# Patient Record
Sex: FEMALE | Race: WHITE | Employment: UNEMPLOYED | URBAN - METROPOLITAN AREA
[De-identification: names, ages, dates, MRNs, and addresses within clinical notes are randomized per-mention and may not be internally consistent; named-entity substitution may affect disease eponyms.]

---

## 2017-03-01 PROBLEM — Z96.641 STATUS POST TOTAL REPLACEMENT OF RIGHT HIP: Status: ACTIVE | Noted: 2017-03-01

## 2018-06-22 ENCOUNTER — HOSPITAL ENCOUNTER (OUTPATIENT)
Dept: RADIATION ONCOLOGY | Age: 83
Discharge: HOME | End: 2018-06-22
Payer: COMMERCIAL

## 2018-06-26 ENCOUNTER — HOSPITAL ENCOUNTER (OUTPATIENT)
Dept: RADIATION ONCOLOGY | Age: 83
Discharge: HOME | End: 2018-06-26
Payer: COMMERCIAL

## 2018-06-26 VITALS
DIASTOLIC BLOOD PRESSURE: 72 MMHG | HEIGHT: 60 IN | HEART RATE: 94 BPM | WEIGHT: 150.2 LBS | TEMPERATURE: 98 F | BODY MASS INDEX: 29.49 KG/M2 | SYSTOLIC BLOOD PRESSURE: 118 MMHG

## 2018-06-26 DIAGNOSIS — C80.1 CANCER (HCC): Primary | ICD-10-CM

## 2018-06-26 PROCEDURE — 99205 OFFICE O/P NEW HI 60 MIN: CPT | Performed by: RADIOLOGY

## 2018-06-26 PROCEDURE — 99205 OFFICE O/P NEW HI 60 MIN: CPT

## 2018-06-26 RX ORDER — FLUOROURACIL 50 MG/G
CREAM TOPICAL 2 TIMES DAILY
COMMUNITY
End: 2019-06-27

## 2018-07-02 ENCOUNTER — HOSPITAL ENCOUNTER (OUTPATIENT)
Dept: RADIATION ONCOLOGY | Age: 83
Discharge: HOME | End: 2018-07-02
Payer: COMMERCIAL

## 2018-07-03 PROCEDURE — 77290 THER RAD SIMULAJ FIELD CPLX: CPT

## 2018-07-03 PROCEDURE — 77334 RADIATION TREATMENT AID(S): CPT

## 2018-07-17 ENCOUNTER — HOSPITAL ENCOUNTER (OUTPATIENT)
Dept: RADIATION ONCOLOGY | Age: 83
Discharge: HOME | End: 2018-07-17
Payer: COMMERCIAL

## 2018-07-17 DIAGNOSIS — C80.1 CANCER (HCC): Primary | ICD-10-CM

## 2018-07-17 PROCEDURE — 77334 RADIATION TREATMENT AID(S): CPT

## 2018-07-17 PROCEDURE — 77290 THER RAD SIMULAJ FIELD CPLX: CPT

## 2018-07-17 PROCEDURE — 77300 RADIATION THERAPY DOSE PLAN: CPT

## 2018-07-17 PROCEDURE — 99999 PR OFFICE/OUTPT VISIT,PROCEDURE ONLY: CPT | Performed by: RADIOLOGY

## 2018-07-17 NOTE — PROGRESS NOTES
(PRAVACHOL) 40 MG tablet Take 40 mg by mouth daily      amLODIPine (NORVASC) 10 MG tablet Take 10 mg by mouth daily       No current facility-administered medications for this encounter. OBJECTIVE:  Alert and fully ambulatory. Pleasant and conversant. Physical Examination: General appearance - alert, well appearing, and in no distress. Wt Readings from Last 3 Encounters:   06/26/18 150 lb 3.2 oz (68.1 kg)   03/01/17 179 lb (81.2 kg)   01/18/17 130 lb (59 kg)         ASSESSMENT/PLAN:     Patient is tolerating treatments well with expected toxicities. RBA were reviewed prior to first fraction and PRN. Current and planned dose reviewed. Goals of treatment and potential side effects were reviewed with the patient PRN. Treatment imaging has been personally reviewed for accuracy and precision. Questions answered to apparent satisfaction. Treatments will continue as planned. Carey Monroe.  Marie Anderson MD MS DABR  Radiation Oncologist        [facility]): [phone] /// FAX: [phone]  [facility]): [phone] /// FAX: [phone]  [facility]): [phone] /// FAX: [phone]

## 2018-07-17 NOTE — PATIENT INSTRUCTIONS
Start RT      Jed Sher. MD Kinjal Castro [facility]  Oncology  Cell: [phone]    [facility]:  [phone]   FAX: [phone] [address] [facility]:  [address] [place] [address] Avenue:    [phone]  [facility]:  [phone]   FAX:  [phone]  Email: [email or web address]

## 2018-07-19 PROCEDURE — 77412 RADIATION TX DELIVERY LVL 3: CPT

## 2018-07-20 PROCEDURE — 77412 RADIATION TX DELIVERY LVL 3: CPT

## 2018-07-23 PROCEDURE — 77412 RADIATION TX DELIVERY LVL 3: CPT

## 2018-07-24 ENCOUNTER — HOSPITAL ENCOUNTER (OUTPATIENT)
Dept: RADIATION ONCOLOGY | Age: 83
Discharge: HOME | End: 2018-07-24
Payer: COMMERCIAL

## 2018-07-24 DIAGNOSIS — C80.1 CANCER (HCC): Primary | ICD-10-CM

## 2018-07-24 PROCEDURE — 99999 PR OFFICE/OUTPT VISIT,PROCEDURE ONLY: CPT | Performed by: RADIOLOGY

## 2018-07-24 PROCEDURE — 77412 RADIATION TX DELIVERY LVL 3: CPT

## 2018-07-24 NOTE — PROGRESS NOTES
DEPARTMENT OF RADIATION ONCOLOGY ON TREATMENT VISIT         7/24/2018      NAME:  Nikki Cruz Chrissie OF BIRTH:  1935      Diagnosis:  Skin CA LLE      SUBJECTIVE:   Asad Brice has now received fractionated external beam radiation therapy - ongoing. Past medical, surgical, social and family histories reviewed and updated as indicated. Pain: controlled      ALLERGIES:  Patient has no known allergies. Current Outpatient Prescriptions   Medication Sig Dispense Refill    apixaban (ELIQUIS) 2.5 MG TABS tablet Take by mouth 2 times daily      insulin detemir (LEVEMIR) 100 UNIT/ML injection vial Inject into the skin nightly      fluorouracil (EFUDEX) 5 % cream Apply topically 2 times daily Apply topically 2 times daily.       rivaroxaban (XARELTO) 20 MG TABS tablet Take 20 mg by mouth nightly      glipiZIDE (GLUCOTROL) 5 MG tablet Take 1 tablet by mouth every morning (before breakfast) 60 tablet 3    pantoprazole (PROTONIX) 40 MG tablet Take 1 tablet by mouth every morning (before breakfast) 30 tablet 3    HYDROcodone-acetaminophen (NORCO) 5-325 MG per tablet Take 1 tablet by mouth every 4 hours as needed for Pain 60 tablet 0    hypromellose (ARTIFICIAL TEARS) 0.4 % SOLN ophthalmic solution Place 2 drops into both eyes every 4 hours as needed      Sodium Phosphates (FLEET) 7-19 GM/118ML Place 1 enema rectally See Admin Instructions Every fourth day if no bm      mirtazapine (REMERON) 15 MG tablet Take 15 mg by mouth nightly      traMADol (ULTRAM) 50 MG tablet Take 1 tablet by mouth 3 times daily 90 tablet 0    Bisacodyl (DULCOLAX RE) Place rectally      magnesium hydroxide (MILK OF MAGNESIA) 400 MG/5ML suspension Take by mouth daily as needed for Constipation      memantine (NAMENDA XR) 28 MG CP24 capsule Take 28 mg by mouth daily      acetaminophen (TYLENOL) 325 MG tablet Take 650 mg by mouth every 4 hours as needed for Pain      pravastatin (PRAVACHOL) 40 MG tablet Take 40 mg by mouth daily      amLODIPine (NORVASC) 10 MG tablet Take 10 mg by mouth daily       No current facility-administered medications for this encounter. OBJECTIVE:  Alert and fully ambulatory. Pleasant and conversant. Physical Examination: General appearance - alert, well appearing, and in no distress /// baseline. Wt Readings from Last 3 Encounters:   06/26/18 150 lb 3.2 oz (68.1 kg)   03/01/17 179 lb (81.2 kg)   01/18/17 130 lb (59 kg)           ASSESSMENT/PLAN:     Patient is tolerating treatments well with expected toxicities. RBA were reviewed prior to first fraction and PRN. Current and planned dose reviewed. Goals of treatment and potential side effects were reviewed with the patient PRN. Treatment imaging has been personally reviewed for accuracy and precision. Questions answered to apparent satisfaction. Treatments will continue as planned. Davey Rahman.  Harris Escalona MD MS DABR  Radiation Oncologist        [facility] ([address] [place] [address]): [phone] /// FAX: [phone]  [facility]): [phone] /// FAX: [phone]  [address] [place] [address]): [phone] /// FAX: [phone]

## 2018-07-25 ENCOUNTER — HOSPITAL ENCOUNTER (OUTPATIENT)
Dept: RADIATION ONCOLOGY | Age: 83
Discharge: HOME | End: 2018-07-25
Payer: COMMERCIAL

## 2018-07-25 PROCEDURE — 77336 RADIATION PHYSICS CONSULT: CPT

## 2018-07-25 PROCEDURE — 77412 RADIATION TX DELIVERY LVL 3: CPT

## 2018-07-26 PROCEDURE — 77412 RADIATION TX DELIVERY LVL 3: CPT

## 2018-07-27 PROCEDURE — 77412 RADIATION TX DELIVERY LVL 3: CPT

## 2018-07-30 PROCEDURE — 77412 RADIATION TX DELIVERY LVL 3: CPT

## 2018-07-31 ENCOUNTER — HOSPITAL ENCOUNTER (OUTPATIENT)
Dept: RADIATION ONCOLOGY | Age: 83
Discharge: HOME | End: 2018-07-31
Payer: COMMERCIAL

## 2018-07-31 DIAGNOSIS — C80.1 CANCER (HCC): Primary | ICD-10-CM

## 2018-07-31 PROCEDURE — 77412 RADIATION TX DELIVERY LVL 3: CPT

## 2018-07-31 PROCEDURE — 99999 PR OFFICE/OUTPT VISIT,PROCEDURE ONLY: CPT | Performed by: RADIOLOGY

## 2018-07-31 NOTE — PATIENT INSTRUCTIONS
Continue daily fractionated radiation therapy as scheduled. Please see weekly OTV note and intial consultation letter in [facility] for clinical details. Hugo Hopper.  Michelle Wright MD [address] [place] [address]  Radiation Oncology  [place]:  [phone]   FAX:    [phone] [facility]: [address] [place] [address] Road:  [phone]

## 2018-08-01 ENCOUNTER — HOSPITAL ENCOUNTER (OUTPATIENT)
Dept: RADIATION ONCOLOGY | Age: 83
Discharge: HOME | End: 2018-08-01
Payer: COMMERCIAL

## 2018-08-01 PROCEDURE — 77412 RADIATION TX DELIVERY LVL 3: CPT

## 2018-08-01 PROCEDURE — 77336 RADIATION PHYSICS CONSULT: CPT

## 2018-08-02 ENCOUNTER — HOSPITAL ENCOUNTER (OUTPATIENT)
Dept: RADIATION ONCOLOGY | Age: 83
Discharge: HOME | End: 2018-08-02
Payer: COMMERCIAL

## 2018-08-02 VITALS — SYSTOLIC BLOOD PRESSURE: 128 MMHG | HEART RATE: 101 BPM | DIASTOLIC BLOOD PRESSURE: 82 MMHG

## 2018-08-02 PROCEDURE — 77412 RADIATION TX DELIVERY LVL 3: CPT

## 2018-08-02 NOTE — PROGRESS NOTES
Rebekah Prado  8/2/2018  2:31 PM      Chief Complaint   Patient presents with    Cancer          Wt Readings from Last 3 Encounters:   06/26/18 150 lb 3.2 oz (68.1 kg)   03/01/17 179 lb (81.2 kg)   01/18/17 130 lb (59 kg)       Comments: OTV @ 3025 cGy using 6 MeV electrons (fx 11/20). She is ultimately planned for 5500 cGy in 275 cGy fx. Her lesion appears more confined trhan her initial description so it appears to be working.  No new lesions observed      Plan: Continue XRT to 5500 cGy (9 more fx)      Electronically signed by Gerardo Cano MD on 8/2/18 at 2:31 PM

## 2018-08-03 PROCEDURE — 77412 RADIATION TX DELIVERY LVL 3: CPT

## 2018-08-06 PROCEDURE — 77412 RADIATION TX DELIVERY LVL 3: CPT

## 2018-08-07 ENCOUNTER — HOSPITAL ENCOUNTER (OUTPATIENT)
Dept: RADIATION ONCOLOGY | Age: 83
Discharge: HOME | End: 2018-08-07
Payer: COMMERCIAL

## 2018-08-07 VITALS — DIASTOLIC BLOOD PRESSURE: 85 MMHG | SYSTOLIC BLOOD PRESSURE: 115 MMHG | TEMPERATURE: 98.3 F | HEART RATE: 108 BPM

## 2018-08-07 DIAGNOSIS — C80.1 CANCER (HCC): Primary | ICD-10-CM

## 2018-08-07 PROCEDURE — 99999 PR OFFICE/OUTPT VISIT,PROCEDURE ONLY: CPT | Performed by: RADIOLOGY

## 2018-08-07 PROCEDURE — 77412 RADIATION TX DELIVERY LVL 3: CPT

## 2018-08-07 NOTE — PATIENT INSTRUCTIONS
Continue daily fractionated radiation therapy as scheduled. Please see weekly OTV note and intial consultation letter in [facility] for clinical details. Jed Sher.  Yandel Bustillo MD [address] [place] [address]  Radiation Oncology  [place]:  [phone]   FAX:    [phone] [facility]: [address]:  [phone]

## 2018-08-07 NOTE — PROGRESS NOTES
Abel Hanna  8/7/2018  Wt Readings from Last 3 Encounters:   06/26/18 150 lb 3.2 oz (68.1 kg)   03/01/17 179 lb (81.2 kg)   01/18/17 130 lb (59 kg)     There is no height or weight on file to calculate BMI. Treatment Area:left leg anterior     Patient was seen today for weekly visit. Comfort Alteration  KPS:90%  Fatigue: Mild        Nutritional Alteration  Anorexia: No   Nausea: No   Vomiting: No    Skin Alteration   Sensation:na    Radiation Dermatitis:  na    Mucous Membrane Alteration  Drainage: Yes  Drainage Odor: No    Emotional  Coping: effective      Injury, potential bleeding or infection: na      Other:na    Lab Results   Component Value Date    WBC 11.8 (H) 12/05/2016     12/05/2016         /85   Pulse 108   BP within normal range? yes   -if no, manually recheck in 5-10 min  NEW BP reading:  BP within normal range? yes   -if no, notify attending provider for further instruction      Assessment/Plan: Patients skin looks crusted over but no odor noted. The patient has clear drainage coming from the treatment area and it is partly crusted over from the natural wound healing process.     Si Apa

## 2018-08-08 PROCEDURE — 77412 RADIATION TX DELIVERY LVL 3: CPT

## 2018-08-08 PROCEDURE — 77336 RADIATION PHYSICS CONSULT: CPT

## 2018-08-09 PROCEDURE — 77412 RADIATION TX DELIVERY LVL 3: CPT

## 2018-08-10 PROCEDURE — 77412 RADIATION TX DELIVERY LVL 3: CPT

## 2018-08-13 ENCOUNTER — PRE-PROCEDURE TELEPHONE (OUTPATIENT)
Dept: RADIATION ONCOLOGY | Age: 83
End: 2018-08-13

## 2018-08-13 PROCEDURE — 77412 RADIATION TX DELIVERY LVL 3: CPT

## 2018-08-13 NOTE — TELEPHONE ENCOUNTER
Per request of Marge TREVIÑO, patient is upset and feels there may be a problem with her son. After her daily radiation treatment, I spoke with patient. Patient states she last spoke with her son when they were living in [place]. Patient is not sure how long ago that was. She was not able to give me any time frame between one to ten years ago. Patient also does not know what state her son may currently be living. Patient states she has a \"mother's intuition\" that there is something wrong. She has another daughter. She has not spoken with this daughter for an indeterminate amount of time as well and does not believe this daughter knows how to reach her son either. After reviewing her EPIC chart, I was unable to find any . Patient is currently residing at [facility]. [facility] transports her back and forth for radiation treatments. Dr. Irena Dahl is listed as her psychiatrist on her demographics from nursing facility.

## 2018-08-14 PROCEDURE — 77412 RADIATION TX DELIVERY LVL 3: CPT

## 2018-08-15 PROCEDURE — 77412 RADIATION TX DELIVERY LVL 3: CPT

## 2018-08-15 PROCEDURE — 77336 RADIATION PHYSICS CONSULT: CPT

## 2018-08-15 NOTE — PROGRESS NOTES
Yenni Kim  8/15/2018  9:03 AM          Current Outpatient Prescriptions   Medication Sig Dispense Refill    apixaban (ELIQUIS) 2.5 MG TABS tablet Take by mouth 2 times daily      insulin detemir (LEVEMIR) 100 UNIT/ML injection vial Inject into the skin nightly      fluorouracil (EFUDEX) 5 % cream Apply topically 2 times daily Apply topically 2 times daily.  rivaroxaban (XARELTO) 20 MG TABS tablet Take 20 mg by mouth nightly      glipiZIDE (GLUCOTROL) 5 MG tablet Take 1 tablet by mouth every morning (before breakfast) 60 tablet 3    pantoprazole (PROTONIX) 40 MG tablet Take 1 tablet by mouth every morning (before breakfast) 30 tablet 3    HYDROcodone-acetaminophen (NORCO) 5-325 MG per tablet Take 1 tablet by mouth every 4 hours as needed for Pain 60 tablet 0    hypromellose (ARTIFICIAL TEARS) 0.4 % SOLN ophthalmic solution Place 2 drops into both eyes every 4 hours as needed      Sodium Phosphates (FLEET) 7-19 GM/118ML Place 1 enema rectally See Admin Instructions Every fourth day if no bm      mirtazapine (REMERON) 15 MG tablet Take 15 mg by mouth nightly      traMADol (ULTRAM) 50 MG tablet Take 1 tablet by mouth 3 times daily 90 tablet 0    Bisacodyl (DULCOLAX RE) Place rectally      magnesium hydroxide (MILK OF MAGNESIA) 400 MG/5ML suspension Take by mouth daily as needed for Constipation      memantine (NAMENDA XR) 28 MG CP24 capsule Take 28 mg by mouth daily      acetaminophen (TYLENOL) 325 MG tablet Take 650 mg by mouth every 4 hours as needed for Pain      pravastatin (PRAVACHOL) 40 MG tablet Take 40 mg by mouth daily      amLODIPine (NORVASC) 10 MG tablet Take 10 mg by mouth daily       No current facility-administered medications for this encounter. This is an up-to-date medication list.    Please take this list to your next care provider, and discard any previous medication lists.

## 2018-08-16 ENCOUNTER — HOSPITAL ENCOUNTER (OUTPATIENT)
Dept: RADIATION ONCOLOGY | Age: 83
End: 2018-08-16
Payer: COMMERCIAL

## 2018-09-26 ENCOUNTER — HOSPITAL ENCOUNTER (OUTPATIENT)
Dept: RADIATION ONCOLOGY | Age: 83
Discharge: HOME | End: 2018-09-26
Payer: COMMERCIAL

## 2018-09-26 VITALS — HEART RATE: 96 BPM | SYSTOLIC BLOOD PRESSURE: 136 MMHG | DIASTOLIC BLOOD PRESSURE: 80 MMHG

## 2018-09-26 DIAGNOSIS — C80.1 CANCER (HCC): Primary | ICD-10-CM
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
## 2018-12-20 ENCOUNTER — TELEPHONE (OUTPATIENT)
Dept: RADIATION ONCOLOGY | Age: 83
End: 2018-12-20

## 2018-12-24 ENCOUNTER — HOSPITAL ENCOUNTER (OUTPATIENT)
Dept: RADIATION ONCOLOGY | Age: 83
Discharge: HOME | End: 2018-12-24
Payer: COMMERCIAL

## 2018-12-24 VITALS
SYSTOLIC BLOOD PRESSURE: 138 MMHG | DIASTOLIC BLOOD PRESSURE: 58 MMHG | HEART RATE: 110 BPM | TEMPERATURE: 98.4 F | OXYGEN SATURATION: 93 %

## 2018-12-24 DIAGNOSIS — C80.1 CANCER (HCC): Primary | ICD-10-CM

## 2018-12-24 PROCEDURE — 99214 OFFICE O/P EST MOD 30 MIN: CPT | Performed by: RADIOLOGY

## 2018-12-24 NOTE — PROGRESS NOTES
Patient is seen today in follow up, follow up at nursing home's request to check previously treated area-specifically skin to left anterior distal legdry scabbed area noted to left lower shin -vascular discoloration noted surrounding ankle-radiation therapy was given from 7/19/2018-8/15/2018-5500cGy in 20 fractions. FALLS RISK SCREEN  Instructions:  Assess the patient and enter the appropriate indicators that are present for fall risk identification. Total the numbers entered and assign a fall risk score from Table 2.  Reassess patient at a minimum every 12 weeks or with status change. Assessment   Date  12/24/2018   1. Mental Ability: confusion/cognitively impaired 3-hx of dementia-patient alert to name, birthday, and the holiday tomorrow (3)   2. Elimination Issues: incontinence, frequency 0 (3)   3. Ambulatory: use of assistive devices (walker, cane, off-loading devices),        attached to equipment (IV pole, oxygen) 2 (2)   4.  Sensory Limitations: dizziness, vertigo, impaired vision 0 (3)   5. Age less than 65                 0 (0)   6. Age 72 or greater 1 (1)   7. Medication: diuretics, strong analgesics, hypnotics, sedatives,        antihypertensive agents 3 (3)   8. Falls:  recent history of falls within the last 3 months (not to include slipping or        tripping) 0 (7)   TOTAL 9-patient resides at nursing home-falls prevention safety in place  If score of 4 or greater was education given? No         TABLE 2   Risk Score Risk Level Plan of Care   0-3 Little or  No Risk 1. Provide assistance as indicated for ambulation activities  2. Reorient confused/cognitively impaired patient  3. Call-light/bell within patient's reach  4. Chair/bed in low position, stretcher/bed with siderails up except when performing patient care activities  5.   Educate patient/family/caregiver on falls prevention  6.  Reassess in 12 weeks or with any noted change in patient condition which places action  Score 2 or greater:  1. For Non-Diabetic Patient: Recommend adding Ensure Complete 2xdaily and provide              patient with Ensure wellness bag with coupons; For Diabetic Patient, Recommend adding Glucerna Shake 2xdaily and provide patient with Glucerna Wellness bag with coupons  2.  Route to the dietitian via [address] [place] [address]

## 2019-02-22 ENCOUNTER — OFFICE VISIT (OUTPATIENT)
Dept: SURGERY | Age: 84
End: 2019-02-22
Payer: COMMERCIAL

## 2019-02-22 VITALS — RESPIRATION RATE: 16 BRPM

## 2019-02-22 DIAGNOSIS — C44.91 SKIN CANCER, BASAL CELL: Primary | ICD-10-CM

## 2019-02-22 PROCEDURE — 1101F PT FALLS ASSESS-DOCD LE1/YR: CPT | Performed by: PLASTIC SURGERY

## 2019-02-22 PROCEDURE — G8419 CALC BMI OUT NRM PARAM NOF/U: HCPCS | Performed by: PLASTIC SURGERY

## 2019-02-22 PROCEDURE — 4004F PT TOBACCO SCREEN RCVD TLK: CPT | Performed by: PLASTIC SURGERY

## 2019-02-22 PROCEDURE — 1123F ACP DISCUSS/DSCN MKR DOCD: CPT | Performed by: PLASTIC SURGERY

## 2019-02-22 PROCEDURE — 4040F PNEUMOC VAC/ADMIN/RCVD: CPT | Performed by: PLASTIC SURGERY

## 2019-02-22 PROCEDURE — G8427 DOCREV CUR MEDS BY ELIG CLIN: HCPCS | Performed by: PLASTIC SURGERY

## 2019-02-22 PROCEDURE — G8400 PT W/DXA NO RESULTS DOC: HCPCS | Performed by: PLASTIC SURGERY

## 2019-02-22 PROCEDURE — G8484 FLU IMMUNIZE NO ADMIN: HCPCS | Performed by: PLASTIC SURGERY

## 2019-02-22 PROCEDURE — 99203 OFFICE O/P NEW LOW 30 MIN: CPT | Performed by: PLASTIC SURGERY

## 2019-02-22 PROCEDURE — 1090F PRES/ABSN URINE INCON ASSESS: CPT | Performed by: PLASTIC SURGERY

## 2019-02-22 RX ORDER — FLUOROURACIL 50 MG/G
CREAM TOPICAL
Qty: 40 G | Refills: 0 | Status: SHIPPED | OUTPATIENT
Start: 2019-02-22 | End: 2019-06-27

## 2019-03-27 ENCOUNTER — TELEPHONE (OUTPATIENT)
Dept: SURGERY | Age: 84
End: 2019-03-27

## 2019-05-25 ENCOUNTER — HOSPITAL ENCOUNTER (EMERGENCY)
Age: 84
Discharge: NURSING, REHAB OR LONG-TERM CARE | End: 2019-05-26
Attending: EMERGENCY MEDICINE
Payer: COMMERCIAL

## 2019-05-25 DIAGNOSIS — T50.901A MEDICATION ADMINISTERED IN ERROR, ACCIDENTAL OR UNINTENTIONAL, INITIAL ENCOUNTER: Primary | ICD-10-CM

## 2019-05-25 PROCEDURE — 99283 EMERGENCY DEPT VISIT LOW MDM: CPT

## 2019-05-25 PROCEDURE — 93005 ELECTROCARDIOGRAM TRACING: CPT | Performed by: EMERGENCY MEDICINE

## 2019-05-25 RX ORDER — SODIUM CHLORIDE 0.9 % (FLUSH) 0.9 %
SYRINGE (ML) INJECTION
Status: DISCONTINUED
Start: 2019-05-25 | End: 2019-05-26 | Stop reason: HOSPADM

## 2019-05-26 ENCOUNTER — APPOINTMENT (OUTPATIENT)
Dept: GENERAL RADIOLOGY | Age: 84
End: 2019-05-26
Payer: COMMERCIAL

## 2019-05-26 VITALS
WEIGHT: 150 LBS | RESPIRATION RATE: 16 BRPM | HEART RATE: 89 BPM | SYSTOLIC BLOOD PRESSURE: 120 MMHG | DIASTOLIC BLOOD PRESSURE: 67 MMHG | BODY MASS INDEX: 25.61 KG/M2 | HEIGHT: 64 IN | TEMPERATURE: 98.1 F | OXYGEN SATURATION: 96 %

## 2019-05-26 LAB
ALBUMIN SERPL-MCNC: 3.6 G/DL (ref 3.5–5.2)
ALP BLD-CCNC: 113 U/L (ref 35–104)
ALT SERPL-CCNC: 11 U/L (ref 0–32)
ANION GAP SERPL CALCULATED.3IONS-SCNC: 11 MMOL/L (ref 7–16)
AST SERPL-CCNC: 18 U/L (ref 0–31)
B.E.: -0.2 MMOL/L (ref -3–3)
BASOPHILS ABSOLUTE: 0.09 E9/L (ref 0–0.2)
BASOPHILS RELATIVE PERCENT: 0.9 % (ref 0–2)
BILIRUB SERPL-MCNC: 0.2 MG/DL (ref 0–1.2)
BUN BLDV-MCNC: 16 MG/DL (ref 8–23)
CALCIUM SERPL-MCNC: 8.8 MG/DL (ref 8.6–10.2)
CHLORIDE BLD-SCNC: 99 MMOL/L (ref 98–107)
CO2: 27 MMOL/L (ref 22–29)
COHB: 1 % (ref 0–1.5)
CREAT SERPL-MCNC: 0.6 MG/DL (ref 0.5–1)
CRITICAL: NORMAL
DATE ANALYZED: NORMAL
DATE OF COLLECTION: NORMAL
EKG ATRIAL RATE: 113 BPM
EKG P AXIS: 45 DEGREES
EKG P-R INTERVAL: 160 MS
EKG Q-T INTERVAL: 356 MS
EKG QRS DURATION: 94 MS
EKG QTC CALCULATION (BAZETT): 488 MS
EKG R AXIS: -86 DEGREES
EKG T AXIS: 43 DEGREES
EKG VENTRICULAR RATE: 113 BPM
EOSINOPHILS ABSOLUTE: 0.34 E9/L (ref 0.05–0.5)
EOSINOPHILS RELATIVE PERCENT: 3.4 % (ref 0–6)
GFR AFRICAN AMERICAN: >60
GFR NON-AFRICAN AMERICAN: >60 ML/MIN/1.73
GLUCOSE BLD-MCNC: 236 MG/DL (ref 74–99)
HCO3: 24.7 MMOL/L (ref 22–26)
HCT VFR BLD CALC: 38.8 % (ref 34–48)
HEMOGLOBIN: 12.8 G/DL (ref 11.5–15.5)
HHB: 3.5 % (ref 0–5)
IMMATURE GRANULOCYTES #: 0.03 E9/L
IMMATURE GRANULOCYTES %: 0.3 % (ref 0–5)
LAB: NORMAL
LYMPHOCYTES ABSOLUTE: 3.58 E9/L (ref 1.5–4)
LYMPHOCYTES RELATIVE PERCENT: 35.7 % (ref 20–42)
Lab: NORMAL
MCH RBC QN AUTO: 29 PG (ref 26–35)
MCHC RBC AUTO-ENTMCNC: 33 % (ref 32–34.5)
MCV RBC AUTO: 88 FL (ref 80–99.9)
METHB: 0.2 % (ref 0–1.5)
MODE: NORMAL
MONOCYTES ABSOLUTE: 0.81 E9/L (ref 0.1–0.95)
MONOCYTES RELATIVE PERCENT: 8.1 % (ref 2–12)
NEUTROPHILS ABSOLUTE: 5.17 E9/L (ref 1.8–7.3)
NEUTROPHILS RELATIVE PERCENT: 51.6 % (ref 43–80)
O2 CONTENT: 18.5 ML/DL
O2 SATURATION: 96.5 % (ref 92–98.5)
O2HB: 95.3 % (ref 94–97)
OPERATOR ID: 2485
PATIENT TEMP: 37 C
PCO2: 41.2 MMHG (ref 35–45)
PDW BLD-RTO: 14 FL (ref 11.5–15)
PH BLOOD GAS: 7.4 (ref 7.35–7.45)
PLATELET # BLD: 289 E9/L (ref 130–450)
PMV BLD AUTO: 9.5 FL (ref 7–12)
PO2: 85.6 MMHG (ref 60–100)
POTASSIUM REFLEX MAGNESIUM: 3.9 MMOL/L (ref 3.5–5)
RBC # BLD: 4.41 E12/L (ref 3.5–5.5)
SODIUM BLD-SCNC: 137 MMOL/L (ref 132–146)
SOURCE, BLOOD GAS: NORMAL
THB: 13.8 G/DL (ref 11.5–16.5)
TIME ANALYZED: 320
TOTAL PROTEIN: 6.6 G/DL (ref 6.4–8.3)
WBC # BLD: 10 E9/L (ref 4.5–11.5)

## 2019-05-26 PROCEDURE — 82805 BLOOD GASES W/O2 SATURATION: CPT

## 2019-05-26 PROCEDURE — 85025 COMPLETE CBC W/AUTO DIFF WBC: CPT

## 2019-05-26 PROCEDURE — 2580000003 HC RX 258: Performed by: EMERGENCY MEDICINE

## 2019-05-26 PROCEDURE — 93010 ELECTROCARDIOGRAM REPORT: CPT | Performed by: INTERNAL MEDICINE

## 2019-05-26 PROCEDURE — 80053 COMPREHEN METABOLIC PANEL: CPT

## 2019-05-26 PROCEDURE — 71045 X-RAY EXAM CHEST 1 VIEW: CPT

## 2019-05-26 RX ORDER — 0.9 % SODIUM CHLORIDE 0.9 %
500 INTRAVENOUS SOLUTION INTRAVENOUS ONCE
Status: COMPLETED | OUTPATIENT
Start: 2019-05-26 | End: 2019-05-26

## 2019-05-26 RX ORDER — SODIUM CHLORIDE 0.9 % (FLUSH) 0.9 %
SYRINGE (ML) INJECTION
Status: DISCONTINUED
Start: 2019-05-26 | End: 2019-05-26 | Stop reason: HOSPADM

## 2019-05-26 RX ADMIN — SODIUM CHLORIDE 500 ML: 9 INJECTION, SOLUTION INTRAVENOUS at 02:17

## 2019-05-26 RX ADMIN — SODIUM CHLORIDE 500 ML: 9 INJECTION, SOLUTION INTRAVENOUS at 03:23

## 2019-05-26 NOTE — ED NOTES
Pt rolled to L side and propped up w/ 2 pillows; wicking catheter placed on pt     Valeriy Fox RN  05/26/19 5471

## 2019-05-26 NOTE — ED PROVIDER NOTES
Department of Emergency Medicine   ED  Provider Note  Admit Date/RoomTime: 5/25/2019 11:20 PM  ED Room: 24/24 5/25/19  11:29 PM    History of Present Illness:   Gustavo Herrera is a 81 y.o. female presenting to the ED for medication error, beginning 1 hour ago. The complaint has been persistent, moderate in severity, and worsened by nothing. Per EMS patient's nursing home incorrectly administered another patient's medication to this patient tonight accidentally 1 hour day ago. The medications she was administered were memantine 10mg, neurontin 100mg, seroquel 400mg. Her actual medications include amlodipine, eliquis, glipizide, insulin, mirtazepine, memantine xr 28mg, pravastatin. Patient has no complaints at this time. Review of Systems:   Pertinent positives and negatives are stated within HPI, all other systems reviewed and are negative.          --------------------------------------------- PAST HISTORY ---------------------------------------------  Past Medical History:  has a past medical history of Closed right hip fracture ([facility] [address] Dementia, DVT (deep venous thrombosis) ([facility] [address] GERD (gastroesophageal reflux disease), Hyperlipidemia, Hypertension, Left scapula fracture, Metatarsal fracture, Moderate protein malnutrition ([facility] [address] Multiple fractures, Radial styloid fracture, and Type II or unspecified type diabetes mellitus without mention of complication, not stated as uncontrolled. 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 Social History:  reports that she has never smoked. Her smokeless tobacco use includes chew. She reports that she does not drink alcohol or use drugs. Family History: family history is not on file.      The patients home medications PO2 85.6 60.0 - 100.0 mmHg    HCO3 24.7 22.0 - 26.0 mmol/L    B.E. -0.2 -3.0 - 3.0 mmol/L    O2 Sat 96.5 92.0 - 98.5 %    O2Hb 95.3 94.0 - 97.0 %    COHb 1.0 0.0 - 1.5 %    MetHb 0.2 0.0 - 1.5 %    O2 Content 18.5 mL/dL    HHb 3.5 0.0 - 5.0 %    tHb (est) 13.8 11.5 - 16.5 g/dL    Mode NC-2  L     Date Of Collection      Time Collected      Pt Temp 37.0 C     ID [id number]     Lab [id number]     Critical(s) Notified . No Critical Values    EKG 12 Lead   Result Value Ref Range    Ventricular Rate 113 BPM    Atrial Rate 113 BPM    P-R Interval 160 ms    QRS Duration 94 ms    Q-T Interval 356 ms    QTc Calculation (Bazett) 488 ms    P Axis 45 degrees    R Axis -86 degrees    T Axis 43 degrees       RADIOLOGY:  Interpreted by Radiologist.  XR CHEST PORTABLE    (Results Pending)       EKG: This EKG is signed by emergency department physician. Rate: 113  Rhythm: Sinus  Interpretation: LAD, possible anteroseptal infarct age indeterminate. Comparison: stable as compared to patient's most recent EKG 11/22/16      ------------------------- NURSING NOTES AND VITALS REVIEWED ---------------------------   The nursing notes within the ED encounter and vital signs as below have been reviewed. BP (!) 99/59   Pulse 90   Temp 98.2 °F (36.8 °C)   Resp 18   Ht 5' 4\" (1.626 m)   Wt 150 lb (68 kg)   SpO2 96%   BMI 25.75 kg/m²   Oxygen Saturation Interpretation: Normal      ---------------------------------------------------PHYSICAL EXAM--------------------------------------    Constitutional/General: Alert and oriented, chronically ill appearing, non toxic in NAD  Head: Normocephalic and atraumatic  Eyes: PERRL, EOMI, conjunctiva normal, sclera non icteric  Mouth: Oropharynx clear, handling secretions, no trismus, no asymmetry of the posterior oropharynx or uvular edema  Neck: Supple, full ROM, no stridor, no crepitus, no meningeal signs  Respiratory: Lungs clear to auscultation bilaterally, no wheezes, rales, or rhonchi.  Not in respiratory distress  Cardiovascular:  Regular rate. Regular rhythm. No murmurs, gallops, or rubs. 2+ distal pulses  Chest: No chest wall tenderness  GI:  Abdomen Soft, Non tender, Non distended. +BS. No organomegaly, no palpable masses,  No rebound, guarding, or rigidity. Musculoskeletal: Moves all extremities x 4. Warm and well perfused, no clubbing, cyanosis, or edema. Capillary refill <3 seconds. RUE amputee  Integument: skin warm and dry. No rashes. Basal cell carcinoma to left shin. Lymphatic: no lymphadenopathy noted  Neurologic: GCS 15, no focal deficits, symmetric strength 5/5 in the upper and lower extremities bilaterally  Psychiatric: Normal Affect      ------------------------------ ED COURSE/MEDICAL DECISION MAKING----------------------  Medications   sodium chloride flush 0.9 % injection (has no administration in time range)   sodium chloride flush 0.9 % injection (has no administration in time range)   0.9 % sodium chloride bolus (0 mLs Intravenous Stopped 5/26/19 0322)   0.9 % sodium chloride bolus (500 mLs Intravenous New Bag 5/26/19 0323)       ED Course as of May 26 0508   Sun May 26, 2019   0036 Discussed case with poison control. Medications can sedation, 3 hours for Neurontin, peak for Seroquel 6 hours. Anticholinergic. []   3476 Poison control called for follow up. []   5344 Tired but otherwise feeling ok. []      ED Course User Index  [] Jorge Ulrich DO          Medical Decision Making:    Patient presents from nursing home after medications administered and air. Patient asymptomatic. Case discussed poison control. Patient appears clinically dehydrated and was slightly tachycardic this improved after IV fluid administration. Patient monitored for a recommended 6 hours was otherwise stable. Patient returned to her nursing home. Counseling:    The emergency provider has spoken with the patient and discussed todays results, in addition to providing specific details for the plan of care and counseling regarding the diagnosis and prognosis. Questions are answered at this time and they are agreeable with the plan.      --------------------------------- IMPRESSION AND DISPOSITION ---------------------------------    IMPRESSION  1. Medication administered in error, accidental or unintentional, initial encounter        DISPOSITION  Disposition: Discharge to nursing home  Patient condition is stable      NOTE: This report was transcribed using voice recognition software.  Every effort was made to ensure accuracy; however, inadvertent computerized transcription errors may be present         Farhan Barclay DO  Resident  05/26/19 7498

## 2019-05-26 NOTE — ED NOTES
Spoke with [employer] [facility], and [facility] has accepted the trip to transport patient back to facility.       Delmy Shaw RN  05/26/19 6305

## 2019-05-26 NOTE — ED NOTES
ABGs obtained. Pt tolerated well. Resp notified of incoming ABGs via tube.       Aleta Dubose RN  05/26/19 0016

## 2019-05-26 NOTE — ED NOTES
Gave report to  Nurse at [facility], made them aware [facility] was here to transport patient back to facility.       Cedric Galvez RN  05/26/19 3577

## 2019-06-27 ENCOUNTER — HOSPITAL ENCOUNTER (OUTPATIENT)
Dept: RADIATION ONCOLOGY | Age: 84
Setting detail: THERAPIES SERIES
Discharge: HOME | End: 2019-06-27
Payer: COMMERCIAL

## 2019-06-27 VITALS — HEART RATE: 96 BPM | DIASTOLIC BLOOD PRESSURE: 56 MMHG | SYSTOLIC BLOOD PRESSURE: 102 MMHG | OXYGEN SATURATION: 91 %

## 2019-06-27 DIAGNOSIS — C44.90 SKIN CANCER: Primary | ICD-10-CM

## 2019-06-27 PROCEDURE — 99213 OFFICE O/P EST LOW 20 MIN: CPT | Performed by: RADIOLOGY

## 2019-06-27 PROCEDURE — 99212 OFFICE O/P EST SF 10 MIN: CPT

## 2019-06-27 NOTE — PROGRESS NOTES
  amLODIPine (NORVASC) 10 MG tablet, Take 10 mg by mouth daily, Disp: , Rfl:       Patient is seen today in follow up, 6 month follow up having received radiation therapy to the anterior left leg related to Basal Cell Carcinoma. Patient received 5500 cGy in 20 fractions from 7/19/2018 through 8/15/2018. Patient arrives today in a wheelchair from a long-term care facility-patient has a history of dementia-patient denies any pain to treatment site-is unable/refusing to stand for weight as patient feels \"it is not my day. \" Patient is looking forward to seeing doctor today. FALLS RISK SCREENING ASSESSMENT    Instructions:  Assess the patient and enter the appropriate indicators that are present for fall risk identification. Total the numbers entered and assign a fall risk score from Table 2.  Reassess patient at a minimum every 12 weeks or with status change. Assessment   Date  6/27/2019     1. Mental Ability: confusion/cognitively impaired Yes - 3       2. Elimination Issues: incontinence, frequency No - 0       3. Ambulatory: use of assistive devices (walker, cane, off-loading devices), attached to equipment (IV pole, oxygen) Yes - 2     4. Sensory Limitations: dizziness, vertigo, impaired vision No - 0       5. Age 72 years or greater - 1       10. Medication: diuretics, strong analgesics, hypnotics, sedatives, antihypertensive agents   Yes - 3   7. Falls:  recent history of falls within the last 3 months (not to include slipping or tripping)   No - 0   TOTAL 9    If score of 4 or greater was education given? No, pt is from care facility with fall precautions in place. TABLE 2   Risk Score Risk Level Plan of Care   0-3 Little or  No Risk 1. Provide assistance as indicated for ambulation activities  2. Reorient confused/cognitively impaired patient  3. Call-light/bell within patient's reach  4.   Chair/bed in low position, stretcher/bed with siderails up except when performing patient care activities  5. Educate patient/family/caregiver on falls prevention  6.  Reassess in 12 weeks or with any noted change in patient condition which places them at a risk for a fall   4-6 Moderate Risk 1. Provide assistance as indicated for ambulation activities  2. Reorient confused/cognitively impaired patient  3. Call-light/bell within patient's reach  4. Chair/bed in low position, stretcher/bed with siderails up except when performing patient care activities  5. Educate patient/family/caregiver on falls prevention  6. Falls risk precaution (Yellow sticker Level II) placed on patient chart   7 or   Higher High Risk 1. Place patient in easily observable treatment room  2. Patient attended at all times by family member or staff  3. Provide assistance as indicated for ambulation activities  4. Reorient confused/cognitively impaired patient  5. Call-light/bell within patient's reach  6. Chair/bed in low position, stretcher/bed with siderails up except when performing patient care activities  7. Educate patient/family/caregiver on falls prevention  8. Falls risk precaution (Yellow sticker Level III) placed on patient chart           MALNUTRITION RISK SCREENING ASSESSMENT    6/27/2019   Patient:  Alec Squires  Sex:  female    Instructions:  Assess the patient and enter the appropriate indicators that are present for nutrition risk identification. Total the numbers entered and assign a risk score. Follow the appropriate action for total score listed below. Assessment   Date  6/27/2019     1. Have you lost weight without trying? 0- No     2. Have you been eating poorly because of a decreased appetite? 0- No   3. Do you have a diagnosis of head and neck cancer?       0- No                                                                                    TOTAL 0          Score of 0-1: No action  Score 2 or greater:  · For Non-Diabetic Patient: Recommend adding Ensure Complete 2 x daily

## 2019-06-27 NOTE — PROGRESS NOTES
Radiation Oncology   Follow Up Note  Cat Baron. Shanti Bourgeois MD MS DABR      6/27/2019  Sun Julien  Date of Service: 6/27/19        HPI:        Estella Martinez is well known to our service. She is a pleasant 81year old with dementia who underwent fractionated external beam radiation therapy to the LLE. We saw Julia Lety today - there has been a partial response to treatment however residual disease is noted. She has no Sx form this however wound management is indicated and further management however the pt declines surgery and further fractionated external beam radiation therapy although did review the guidelines today. She is on Efudex for the small residual lesions. No new Sx - denies pain and paresthesia.    KPS 50-60.             Past Medical History:   Diagnosis Date    Closed right hip fracture ([address] 7/22/2015    Dementia     DVT (deep venous thrombosis) [facility] [address] 11/24/2016    GERD (gastroesophageal reflux disease)     Hyperlipidemia     Hypertension     Left scapula fracture 7/22/2015    Metatarsal fracture 7/22/2015    Moderate protein malnutrition ([address] 7/16/2015    Multiple fractures     legs, arms, ribs, sternum, facial    Radial styloid fracture 7/22/2015    Type II or unspecified type diabetes mellitus without mention of complication, not stated as uncontrolled          Past Surgical History:   Procedure Laterality Date    ABDOMEN SURGERY      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 Kirsten Tristan Right 11/28/2016    REMOVAL OLD HARDWARE RIGHT HIP;TOTAL RIGHT HIP ARTHROPLASTY    VENA CAVA FILTER PLACEMENT  12/01/2016    Aby Lozoya    VENA CAVA FILTER PLACEMENT Right 12/01/2016         Social History     Socioeconomic History    Marital status: Single     Spouse name: Not on file    Number of children: Not on file    Years of education: Not on file    Highest education level: Not on file palliative Efudex. FU 6 months NP or sooner PRN. *I spent at least 30 on this case and with this patient today including personally performing/reviewing the history, ROS, PE, and providing a summary and description of the detailed medical decision making as a basis for any and all recommendations made today (+/- a pertinent RBA discussion): literature and radiology reviews were performed as noted and applicable (61% or more time was spent in direct patient ). Arpan Lujan. Odalis Nunez MD [place] [id number] Oncology  Cell: [phone]  [facility]:  [phone]   FAX: [phone] [address] [facility]:   [address] [place] [address] Avenue:    [phone]  [address] [place] [address] Road:  [phone]   FAX:  [phone]  Email: [email or web address]      NOTE: This report was transcribed using voice recognition software. Every effort was made to ensure accuracy; however, inadvertent computerized transcription errors may be present.

## 2019-12-19 ENCOUNTER — HOSPITAL ENCOUNTER (OUTPATIENT)
Dept: RADIATION ONCOLOGY | Age: 84
Setting detail: THERAPIES SERIES
Discharge: HOME | End: 2019-12-19
Payer: COMMERCIAL

## 2019-12-19 VITALS
HEART RATE: 102 BPM | DIASTOLIC BLOOD PRESSURE: 54 MMHG | RESPIRATION RATE: 18 BRPM | TEMPERATURE: 98.1 F | OXYGEN SATURATION: 92 % | SYSTOLIC BLOOD PRESSURE: 130 MMHG

## 2019-12-19 DIAGNOSIS — C44.719 BASAL CELL CARCINOMA (BCC) OF SKIN OF LEFT LOWER EXTREMITY INCLUDING HIP: ICD-10-CM

## 2019-12-19 PROCEDURE — 99213 OFFICE O/P EST LOW 20 MIN: CPT | Performed by: NURSE PRACTITIONER

## 2019-12-19 PROCEDURE — 99212 OFFICE O/P EST SF 10 MIN: CPT

## 2019-12-19 RX ORDER — GUAIFENESIN DEXTROMETHORPHAN HYDROBROMIDE ORAL SOLUTION 10; 100 MG/5ML; MG/5ML
10 SOLUTION ORAL EVERY 4 HOURS PRN
COMMUNITY

## 2019-12-19 RX ORDER — AMMONIUM LACTATE 12 G/100G
CREAM TOPICAL EVERY 12 HOURS PRN
COMMUNITY

## 2019-12-20 ENCOUNTER — APPOINTMENT (OUTPATIENT)
Dept: RADIATION ONCOLOGY | Age: 84
End: 2019-12-20
Payer: COMMERCIAL

## 2020-06-24 ENCOUNTER — TELEPHONE (OUTPATIENT)
Dept: RADIATION ONCOLOGY | Age: 85
End: 2020-06-24

## 2020-10-27 ENCOUNTER — HOSPITAL ENCOUNTER (OUTPATIENT)
Age: 85
Discharge: HOME | End: 2020-10-29
Payer: COMMERCIAL

## 2020-10-27 PROCEDURE — 88305 TISSUE EXAM BY PATHOLOGIST: CPT

## 2021-01-31 ENCOUNTER — HOSPITAL ENCOUNTER (INPATIENT)
Age: 86
LOS: 2 days | Discharge: NURSING, REHAB OR LONG-TERM CARE | DRG: 603 | End: 2021-02-02
Attending: EMERGENCY MEDICINE | Admitting: INTERNAL MEDICINE
Payer: COMMERCIAL

## 2021-01-31 DIAGNOSIS — L03.115 CELLULITIS OF RIGHT LOWER EXTREMITY: Primary | ICD-10-CM

## 2021-01-31 PROBLEM — L03.90 CELLULITIS: Status: ACTIVE | Noted: 2021-01-31

## 2021-01-31 LAB
ANION GAP SERPL CALCULATED.3IONS-SCNC: 9 MMOL/L (ref 7–16)
BASOPHILS ABSOLUTE: 0.05 E9/L (ref 0–0.2)
BASOPHILS RELATIVE PERCENT: 0.4 % (ref 0–2)
BUN BLDV-MCNC: 17 MG/DL (ref 8–23)
CALCIUM SERPL-MCNC: 9.1 MG/DL (ref 8.6–10.2)
CHLORIDE BLD-SCNC: 98 MMOL/L (ref 98–107)
CO2: 27 MMOL/L (ref 22–29)
CREAT SERPL-MCNC: 0.7 MG/DL (ref 0.5–1)
EOSINOPHILS ABSOLUTE: 0.25 E9/L (ref 0.05–0.5)
EOSINOPHILS RELATIVE PERCENT: 2 % (ref 0–6)
GFR AFRICAN AMERICAN: >60
GFR NON-AFRICAN AMERICAN: >60 ML/MIN/1.73
GLUCOSE BLD-MCNC: 193 MG/DL (ref 74–99)
HCT VFR BLD CALC: 35.9 % (ref 34–48)
HEMOGLOBIN: 11.9 G/DL (ref 11.5–15.5)
IMMATURE GRANULOCYTES #: 0.2 E9/L
IMMATURE GRANULOCYTES %: 1.6 % (ref 0–5)
LACTIC ACID: 1.7 MMOL/L (ref 0.5–2.2)
LYMPHOCYTES ABSOLUTE: 2.55 E9/L (ref 1.5–4)
LYMPHOCYTES RELATIVE PERCENT: 20.2 % (ref 20–42)
MCH RBC QN AUTO: 28.5 PG (ref 26–35)
MCHC RBC AUTO-ENTMCNC: 33.1 % (ref 32–34.5)
MCV RBC AUTO: 86.1 FL (ref 80–99.9)
METER GLUCOSE: 137 MG/DL (ref 74–99)
METER GLUCOSE: 188 MG/DL (ref 74–99)
MONOCYTES ABSOLUTE: 1.29 E9/L (ref 0.1–0.95)
MONOCYTES RELATIVE PERCENT: 10.2 % (ref 2–12)
NEUTROPHILS ABSOLUTE: 8.28 E9/L (ref 1.8–7.3)
NEUTROPHILS RELATIVE PERCENT: 65.6 % (ref 43–80)
PDW BLD-RTO: 14 FL (ref 11.5–15)
PLATELET # BLD: 376 E9/L (ref 130–450)
PMV BLD AUTO: 9.1 FL (ref 7–12)
POTASSIUM REFLEX MAGNESIUM: 3.8 MMOL/L (ref 3.5–5)
RBC # BLD: 4.17 E12/L (ref 3.5–5.5)
SARS-COV-2, NAAT: NOT DETECTED
SEDIMENTATION RATE, ERYTHROCYTE: 121 MM/HR (ref 0–20)
SODIUM BLD-SCNC: 134 MMOL/L (ref 132–146)
WBC # BLD: 12.6 E9/L (ref 4.5–11.5)

## 2021-01-31 PROCEDURE — 1200000000 HC SEMI PRIVATE

## 2021-01-31 PROCEDURE — 82962 GLUCOSE BLOOD TEST: CPT

## 2021-01-31 PROCEDURE — 36415 COLL VENOUS BLD VENIPUNCTURE: CPT

## 2021-01-31 PROCEDURE — 80048 BASIC METABOLIC PNL TOTAL CA: CPT

## 2021-01-31 PROCEDURE — APPSS45 APP SPLIT SHARED TIME 31-45 MINUTES: Performed by: NURSE PRACTITIONER

## 2021-01-31 PROCEDURE — 85651 RBC SED RATE NONAUTOMATED: CPT

## 2021-01-31 PROCEDURE — 99222 1ST HOSP IP/OBS MODERATE 55: CPT | Performed by: INTERNAL MEDICINE

## 2021-01-31 PROCEDURE — 6370000000 HC RX 637 (ALT 250 FOR IP): Performed by: NURSE PRACTITIONER

## 2021-01-31 PROCEDURE — 85025 COMPLETE CBC W/AUTO DIFF WBC: CPT

## 2021-01-31 PROCEDURE — 6370000000 HC RX 637 (ALT 250 FOR IP): Performed by: SPECIALIST

## 2021-01-31 PROCEDURE — 87040 BLOOD CULTURE FOR BACTERIA: CPT

## 2021-01-31 PROCEDURE — 99285 EMERGENCY DEPT VISIT HI MDM: CPT

## 2021-01-31 PROCEDURE — 2580000003 HC RX 258: Performed by: NURSE PRACTITIONER

## 2021-01-31 PROCEDURE — U0002 COVID-19 LAB TEST NON-CDC: HCPCS

## 2021-01-31 PROCEDURE — 87205 SMEAR GRAM STAIN: CPT

## 2021-01-31 PROCEDURE — 87070 CULTURE OTHR SPECIMN AEROBIC: CPT

## 2021-01-31 PROCEDURE — 2580000003 HC RX 258: Performed by: EMERGENCY MEDICINE

## 2021-01-31 PROCEDURE — 6360000002 HC RX W HCPCS: Performed by: EMERGENCY MEDICINE

## 2021-01-31 PROCEDURE — 83605 ASSAY OF LACTIC ACID: CPT

## 2021-01-31 PROCEDURE — 6370000000 HC RX 637 (ALT 250 FOR IP): Performed by: INTERNAL MEDICINE

## 2021-01-31 RX ORDER — DEXTROSE MONOHYDRATE 25 G/50ML
12.5 INJECTION, SOLUTION INTRAVENOUS PRN
Status: DISCONTINUED | OUTPATIENT
Start: 2021-01-31 | End: 2021-02-03 | Stop reason: HOSPADM

## 2021-01-31 RX ORDER — INSULIN GLARGINE 100 [IU]/ML
30 INJECTION, SOLUTION SUBCUTANEOUS 2 TIMES DAILY
Status: DISCONTINUED | OUTPATIENT
Start: 2021-01-31 | End: 2021-02-01

## 2021-01-31 RX ORDER — ONDANSETRON 2 MG/ML
4 INJECTION INTRAMUSCULAR; INTRAVENOUS EVERY 6 HOURS PRN
Status: DISCONTINUED | OUTPATIENT
Start: 2021-01-31 | End: 2021-02-03 | Stop reason: HOSPADM

## 2021-01-31 RX ORDER — AMLODIPINE BESYLATE 10 MG/1
10 TABLET ORAL DAILY
Status: DISCONTINUED | OUTPATIENT
Start: 2021-01-31 | End: 2021-02-03 | Stop reason: HOSPADM

## 2021-01-31 RX ORDER — ACETAMINOPHEN 325 MG/1
650 TABLET ORAL EVERY 6 HOURS PRN
Status: DISCONTINUED | OUTPATIENT
Start: 2021-01-31 | End: 2021-02-03 | Stop reason: HOSPADM

## 2021-01-31 RX ORDER — ACETAMINOPHEN 650 MG/1
650 SUPPOSITORY RECTAL EVERY 6 HOURS PRN
Status: DISCONTINUED | OUTPATIENT
Start: 2021-01-31 | End: 2021-02-03 | Stop reason: HOSPADM

## 2021-01-31 RX ORDER — GUAIFENESIN/DEXTROMETHORPHAN 100-10MG/5
5 SYRUP ORAL EVERY 6 HOURS PRN
Status: DISCONTINUED | OUTPATIENT
Start: 2021-01-31 | End: 2021-02-03 | Stop reason: HOSPADM

## 2021-01-31 RX ORDER — MEMANTINE HYDROCHLORIDE 10 MG/1
10 TABLET ORAL 2 TIMES DAILY
Status: DISCONTINUED | OUTPATIENT
Start: 2021-01-31 | End: 2021-02-03 | Stop reason: HOSPADM

## 2021-01-31 RX ORDER — GLIPIZIDE 5 MG/1
5 TABLET ORAL
Status: DISCONTINUED | OUTPATIENT
Start: 2021-02-01 | End: 2021-02-03 | Stop reason: HOSPADM

## 2021-01-31 RX ORDER — SODIUM CHLORIDE 0.9 % (FLUSH) 0.9 %
10 SYRINGE (ML) INJECTION EVERY 12 HOURS SCHEDULED
Status: DISCONTINUED | OUTPATIENT
Start: 2021-01-31 | End: 2021-02-03 | Stop reason: HOSPADM

## 2021-01-31 RX ORDER — ACETAMINOPHEN 325 MG/1
650 TABLET ORAL EVERY 4 HOURS PRN
Status: DISCONTINUED | OUTPATIENT
Start: 2021-01-31 | End: 2021-01-31 | Stop reason: DRUGHIGH

## 2021-01-31 RX ORDER — MIRTAZAPINE 15 MG/1
15 TABLET, FILM COATED ORAL NIGHTLY
Status: DISCONTINUED | OUTPATIENT
Start: 2021-01-31 | End: 2021-02-03 | Stop reason: HOSPADM

## 2021-01-31 RX ORDER — PROMETHAZINE HYDROCHLORIDE 25 MG/1
12.5 TABLET ORAL EVERY 6 HOURS PRN
Status: DISCONTINUED | OUTPATIENT
Start: 2021-01-31 | End: 2021-02-03 | Stop reason: HOSPADM

## 2021-01-31 RX ORDER — POLYETHYLENE GLYCOL 3350 17 G/17G
17 POWDER, FOR SOLUTION ORAL DAILY PRN
Status: DISCONTINUED | OUTPATIENT
Start: 2021-01-31 | End: 2021-02-03 | Stop reason: HOSPADM

## 2021-01-31 RX ORDER — SODIUM CHLORIDE 0.9 % (FLUSH) 0.9 %
10 SYRINGE (ML) INJECTION PRN
Status: DISCONTINUED | OUTPATIENT
Start: 2021-01-31 | End: 2021-02-03 | Stop reason: HOSPADM

## 2021-01-31 RX ORDER — NICOTINE POLACRILEX 4 MG
15 LOZENGE BUCCAL PRN
Status: DISCONTINUED | OUTPATIENT
Start: 2021-01-31 | End: 2021-02-03 | Stop reason: HOSPADM

## 2021-01-31 RX ORDER — PANTOPRAZOLE SODIUM 40 MG/1
40 TABLET, DELAYED RELEASE ORAL
Status: DISCONTINUED | OUTPATIENT
Start: 2021-02-01 | End: 2021-02-03 | Stop reason: HOSPADM

## 2021-01-31 RX ORDER — PRAVASTATIN SODIUM 20 MG
40 TABLET ORAL DAILY
Status: DISCONTINUED | OUTPATIENT
Start: 2021-01-31 | End: 2021-02-03 | Stop reason: HOSPADM

## 2021-01-31 RX ORDER — LEVOFLOXACIN 500 MG/1
500 TABLET, FILM COATED ORAL DAILY
Status: DISCONTINUED | OUTPATIENT
Start: 2021-01-31 | End: 2021-02-03 | Stop reason: HOSPADM

## 2021-01-31 RX ORDER — DEXTROSE MONOHYDRATE 50 MG/ML
100 INJECTION, SOLUTION INTRAVENOUS PRN
Status: DISCONTINUED | OUTPATIENT
Start: 2021-01-31 | End: 2021-02-03 | Stop reason: HOSPADM

## 2021-01-31 RX ORDER — CEPHALEXIN 500 MG/1
500 CAPSULE ORAL EVERY 8 HOURS SCHEDULED
Status: DISCONTINUED | OUTPATIENT
Start: 2021-01-31 | End: 2021-02-03 | Stop reason: HOSPADM

## 2021-01-31 RX ADMIN — CEPHALEXIN 500 MG: 500 CAPSULE ORAL at 17:07

## 2021-01-31 RX ADMIN — PIPERACILLIN AND TAZOBACTAM 3375 MG: 3; .375 INJECTION, POWDER, LYOPHILIZED, FOR SOLUTION INTRAVENOUS at 13:44

## 2021-01-31 RX ADMIN — MIRTAZAPINE 15 MG: 15 TABLET, FILM COATED ORAL at 20:26

## 2021-01-31 RX ADMIN — MEMANTINE HYDROCHLORIDE 10 MG: 10 TABLET, FILM COATED ORAL at 20:26

## 2021-01-31 RX ADMIN — LEVOFLOXACIN 500 MG: 500 TABLET, FILM COATED ORAL at 17:05

## 2021-01-31 RX ADMIN — SODIUM CHLORIDE, PRESERVATIVE FREE 10 ML: 5 INJECTION INTRAVENOUS at 20:30

## 2021-01-31 RX ADMIN — INSULIN LISPRO 1 UNITS: 100 INJECTION, SOLUTION INTRAVENOUS; SUBCUTANEOUS at 17:05

## 2021-01-31 RX ADMIN — PROMETHAZINE HYDROCHLORIDE 12.5 MG: 25 TABLET ORAL at 20:26

## 2021-01-31 RX ADMIN — GUAIFENESIN AND DEXTROMETHORPHAN 5 ML: 100; 10 SYRUP ORAL at 17:05

## 2021-01-31 RX ADMIN — INSULIN GLARGINE 30 UNITS: 100 INJECTION, SOLUTION SUBCUTANEOUS at 20:27

## 2021-01-31 RX ADMIN — PRAVASTATIN SODIUM 40 MG: 20 TABLET ORAL at 20:30

## 2021-01-31 RX ADMIN — APIXABAN 2.5 MG: 2.5 TABLET, FILM COATED ORAL at 20:30

## 2021-01-31 RX ADMIN — CEPHALEXIN 500 MG: 500 CAPSULE ORAL at 21:30

## 2021-01-31 ASSESSMENT — PAIN DESCRIPTION - LOCATION: LOCATION: LEG

## 2021-01-31 ASSESSMENT — PAIN DESCRIPTION - PAIN TYPE: TYPE: ACUTE PAIN

## 2021-01-31 NOTE — H&P
[address] [facility]   History and Physical      CHIEF COMPLAINT:  RLE pain/Worsening erythema. History of Present Illness:  81 y.o. female with a history of Dementia, DVT, HTN, HLD, GERD, DM II, and Moderate Protein Calorie Malnutrition presents from SNF with Worsening erythema and pain to RLE. Pt complaint is moderate in severity, persistent and worsened by nothing. Per chart review pt developed erythema to RLE about 1 week ago along with Pneumonia. Pt has been treated with Doxycycline as outpatient. She presents today with worsening Erythema initially at the calf, but has since continued to spread up to the knee. Pt is alert to person states she was at the nursing home. Admits to pain with movement to RLE. She I resting comfortably I bed at this time. No distress noted. RLE erythema/warmth/ swelling noted to RLE. ID physician notified of consult. Pt will be admitted for further evaluation and treatment.       Informant(s) for H&P:EMR    REVIEW OF SYSTEMS:  JOEY 2/2 Dementia      PMH:  Past Medical History:   Diagnosis Date    Closed right hip fracture ([address] 7/22/2015    Dementia ([address]     DVT (deep venous thrombosis) ([address] 11/24/2016    GERD (gastroesophageal reflux disease)     Hyperlipidemia     Hypertension     Left scapula fracture 7/22/2015    Metatarsal fracture 7/22/2015    Moderate protein malnutrition ([address] 7/16/2015    Multiple fractures     legs, arms, ribs, sternum, facial    Radial styloid fracture 7/22/2015    Type II or unspecified type diabetes mellitus without mention of complication, not stated as uncontrolled        Surgical History:  Past Surgical History:   Procedure Laterality Date    ABDOMEN SURGERY      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 Juan Vasquez Right 11/28/2016    REMOVAL OLD HARDWARE RIGHT HIP;TOTAL RIGHT HIP ARTHROPLASTY   Shana 12/01/2016    Aby Lozoya    VENA CAVA FILTER PLACEMENT Right 12/01/2016       Medications Prior to Admission:    Prior to Admission medications    Medication Sig Start Date End Date Taking? Authorizing Provider   insulin lispro (HUMALOG) 100 UNIT/ML injection vial Inject 10 Units into the skin 3 times daily (before meals)    Historical Provider, MD   ammonium lactate (LAC-HYDRIN) 12 % lotion Apply topically 2 times daily Apply topically to BLE as needed.     Historical Provider, MD   dextromethorphan-guaiFENesin (ROBITUSSIN COLD COUGH+ CHEST)  MG/5ML syrup Take 10 mLs by mouth every 4 hours as needed for Cough    Historical Provider, MD   apixaban (ELIQUIS) 2.5 MG TABS tablet Take by mouth 2 times daily    Historical Provider, MD   insulin detemir (LEVEMIR) 100 UNIT/ML injection vial Inject 15 Units into the skin nightly     Historical Provider, MD   glipiZIDE (GLUCOTROL) 5 MG tablet Take 1 tablet by mouth every morning (before breakfast) 12/5/16   Alexandrea Sandy MD   pantoprazole (PROTONIX) 40 MG tablet Take 1 tablet by mouth every morning (before breakfast) 12/5/16   Alexandrea Sandy MD   hypromellose (ARTIFICIAL TEARS) 0.4 % SOLN ophthalmic solution Place 2 drops into both eyes every 4 hours as needed    Historical Provider, MD   Sodium Phosphates (FLEET) 7-19 GM/118ML Place 1 enema rectally See Admin Instructions Every fourth day if no bm    Historical Provider, MD   mirtazapine (REMERON) 15 MG tablet Take 15 mg by mouth nightly    Historical Provider, MD   Bisacodyl (DULCOLAX RE) Place rectally    Historical Provider, MD   magnesium hydroxide (MILK OF MAGNESIA) 400 MG/5ML suspension Take by mouth daily as needed for Constipation    Historical Provider, MD   memantine (NAMENDA XR) 28 MG CP24 capsule Take 28 mg by mouth daily    Historical Provider, MD   acetaminophen (TYLENOL) 325 MG tablet Take 650 mg by mouth every 4 hours as needed for Pain    Historical Provider, MD   pravastatin (PRAVACHOL) 40 MG tablet Take 40 mg by mouth daily    Historical Provider, MD   amLODIPine (NORVASC) 10 MG tablet Take 10 mg by mouth daily    Historical Provider, MD       Allergies:    Patient has no known allergies. Social History:    reports that she has never smoked. Her smokeless tobacco use includes chew. She reports that she does not drink alcohol or use drugs. Family History:   family history is not on file. Unable to review 2/2 Dementia    PHYSICAL EXAM:  Vitals:  BP (!) 141/85   Pulse 89   Temp 97.6 °F (36.4 °C) (Oral)   Resp 18   Ht 5' 4\" (1.626 m)   Wt 150 lb (68 kg)   SpO2 97%   BMI 25.75 kg/m²     General Appearance: alert and oriented to person and in no acute distress  Skin: warm and dry  Head: normocephalic and atraumatic  Eyes: pupils equal, round, and reactive to light, extraocular eye movements intact, conjunctivae normal  Neck: neck supple and non tender without mass   Pulmonary/Chest: clear to auscultation bilaterally- no wheezes, rales or rhonchi, normal air movement, no respiratory distress  Cardiovascular: normal rate, normal S1 and S2 and no rubs, gallops, murmur noted. Abdomen: soft, non-tender, non-distended, normal bowel sounds, no masses or organomegaly  Extremities: no cyanosis, no clubbing and no edema. growth to left foot. RLE edema, erythema, warm and tender to touch. Open wound to lateral RLE. Neurologic: no cranial nerve deficit and speech normal. Pleasantly confused Hx Dementia. LABS:  Recent Labs     01/31/21  1153      K 3.8   CL 98   CO2 27   BUN 17   CREATININE 0.7   GLUCOSE 193*   CALCIUM 9.1       Recent Labs     01/31/21  1153   WBC 12.6*   RBC 4.17   HGB 11.9   HCT 35.9   MCV 86.1   MCH 28.5   MCHC 33.1   RDW 14.0      MPV 9.1       No results for input(s): POCGLU in the last 72 hours. Radiology: No results found.     EKG: none    ASSESSMENT:      Active Problems:    Cellulitis  Resolved Problems:    * No resolved hospital

## 2021-01-31 NOTE — ED PROVIDER NOTES
This is an 81 year with a past medical history of DVTs, hypertension as well as dementia who presents to the ED for evaluation of leg pain. Patient is a poor historian and history is help obtained from the patient's EMS transport as well as nursing home. Apparently the patient was diagnosed with a cellulitis and pneumonia at the same time about 1 week ago. The patient's cellulitis was initially on her calf and over the past week has been spreading up her knee as well as to her leg. Patient apparently has also been complaining of some pain in the leg as well. Patient denies any fevers or chills. Patient has been on doxycycline however this is not to be improving her symptoms. There are no worsening factors. A complete review of systems and history is limited secondary to the patient's clinical condition. The history is provided by the EMS personnel, the nursing home and the patient. Review of Systems   Unable to perform ROS: Dementia        Physical Exam  Constitutional:       General: She is not in acute distress. Appearance: She is not toxic-appearing. HENT:      Head: Normocephalic and atraumatic. Mouth/Throat:      Mouth: Mucous membranes are dry. Eyes:      Extraocular Movements: Extraocular movements intact. Pupils: Pupils are equal, round, and reactive to light. Neck:      Musculoskeletal: Normal range of motion and neck supple. Cardiovascular:      Rate and Rhythm: Regular rhythm. Tachycardia present. Pulmonary:      Breath sounds: No wheezing, rhonchi or rales. Chest:      Chest wall: No tenderness. Abdominal:      Palpations: Abdomen is soft. Tenderness: There is no abdominal tenderness. There is no guarding or rebound. Hernia: No hernia is present. Musculoskeletal:      Right lower leg: No edema. Left lower leg: No edema. Skin:     General: Skin is warm and dry. Capillary Refill: Capillary refill takes less than 2 seconds. Neurological:      Mental Status: She is alert. Comments: Alert to person only          Procedures     MDM  Number of Diagnoses or Management Options  Cellulitis of right lower extremity  Diagnosis management comments: Patient is a 81-year-old female who is a DNR CC who presented to the ED from her skilled nursing facility for worsening erythema and likely cellulitis to her right leg that is now increasing in size. Patient apparently was at her baseline neurologically was in no distress was slightly tachycardic and her leg was warm to touch without any signs of crepitus. I have a low suspicion for necrotizing fasciitis as well as deep abscess given that the patient has no crepitus or significant pain on examination. Patient was treated with IV antibiotics here in the department and given this is failed outpatient antibiotics I thought it best admit the patient for further evaluation. Case was discussed with the medicine team who did agree to meet the patient for further evaluation.                  --------------------------------------------- PAST HISTORY ---------------------------------------------  Past Medical History:  has a past medical history of Closed right hip fracture ([facility] [address] Dementia ([facility] [address] DVT (deep venous thrombosis) ([facility] [address] GERD (gastroesophageal reflux disease), Hyperlipidemia, Hypertension, Left scapula fracture, Metatarsal fracture, Moderate protein malnutrition ([facility] [address] Multiple fractures, Radial styloid fracture, and Type II or unspecified type diabetes mellitus without mention of complication, not stated as uncontrolled. 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 Social History:  reports that she has never smoked.  Her smokeless tobacco use includes chew. She reports that she does not drink alcohol or use drugs. Family History: family history is not on file. The patients home medications have been reviewed. Allergies: Patient has no known allergies.     -------------------------------------------------- RESULTS -------------------------------------------------    LABS:  Results for orders placed or performed during the hospital encounter of 90/57/19   Basic Metabolic Panel w/ Reflex to MG   Result Value Ref Range    Sodium 134 132 - 146 mmol/L    Potassium reflex Magnesium 3.8 3.5 - 5.0 mmol/L    Chloride 98 98 - 107 mmol/L    CO2 27 22 - 29 mmol/L    Anion Gap 9 7 - 16 mmol/L    Glucose 193 (H) 74 - 99 mg/dL    BUN 17 8 - 23 mg/dL    CREATININE 0.7 0.5 - 1.0 mg/dL    GFR Non-African American >60 >=60 mL/min/1.73    GFR African American >60     Calcium 9.1 8.6 - 10.2 mg/dL   CBC auto differential   Result Value Ref Range    WBC 12.6 (H) 4.5 - 11.5 E9/L    RBC 4.17 3.50 - 5.50 E12/L    Hemoglobin 11.9 11.5 - 15.5 g/dL    Hematocrit 35.9 34.0 - 48.0 %    MCV 86.1 80.0 - 99.9 fL    MCH 28.5 26.0 - 35.0 pg    MCHC 33.1 32.0 - 34.5 %    RDW 14.0 11.5 - 15.0 fL    Platelets 017 134 - 978 E9/L    MPV 9.1 7.0 - 12.0 fL    Neutrophils % 65.6 43.0 - 80.0 %    Immature Granulocytes % 1.6 0.0 - 5.0 %    Lymphocytes % 20.2 20.0 - 42.0 %    Monocytes % 10.2 2.0 - 12.0 %    Eosinophils % 2.0 0.0 - 6.0 %    Basophils % 0.4 0.0 - 2.0 %    Neutrophils Absolute 8.28 (H) 1.80 - 7.30 E9/L    Immature Granulocytes # 0.20 E9/L    Lymphocytes Absolute 2.55 1.50 - 4.00 E9/L    Monocytes Absolute 1.29 (H) 0.10 - 0.95 E9/L    Eosinophils Absolute 0.25 0.05 - 0.50 E9/L    Basophils Absolute 0.05 0.00 - 0.20 E9/L   Lactic Acid, Plasma   Result Value Ref Range    Lactic Acid 1.7 0.5 - 2.2 mmol/L   COVID-19   Result Value Ref Range    SARS-CoV-2, NAAT Not Detected Not Detected       RADIOLOGY:  No orders to display           ------------------------- NURSING NOTES AND VITALS REVIEWED ---------------------------  Date / Time Roomed:  1/31/2021 11:40 AM  ED Bed Assignment:  02/02    The nursing notes within the ED encounter and vital signs as below have been reviewed. Patient Vitals for the past 24 hrs:   BP Temp Temp src Pulse Resp SpO2 Height Weight   01/31/21 1337 112/76 -- -- 83 16 96 % -- --   01/31/21 1227 121/71 -- -- 93 16 96 % -- --   01/31/21 1147 119/70 -- -- -- -- -- -- --   01/31/21 1145 -- 98.2 °F (36.8 °C) Oral 110 16 96 % 5' 4\" (1.626 m) 150 lb (68 kg)       Oxygen Saturation Interpretation: Normal    ------------------------------------------ PROGRESS NOTES ------------------------------------------  Re-evaluation(s):  Time: 4018  Patients symptoms show no change  Repeat physical examination is not changed    Counseling:  I have spoken with the patient and discussed todays results, in addition to providing specific details for the plan of care and counseling regarding the diagnosis and prognosis. Their questions are answered at this time and they are agreeable with the plan of admission.    --------------------------------- ADDITIONAL PROVIDER NOTES ---------------------------------  Consultations:   Spoke with Dr. Jon Medel. Discussed case. They will admit the patient. This patient's ED course included: a personal history and physicial examination, re-evaluation prior to disposition, multiple bedside re-evaluations, IV medications, cardiac monitoring and complex medical decision making and emergency management    This patient has remained hemodynamically stable during their ED course. Diagnosis:  1. Cellulitis of right lower extremity        Disposition:  Patient's disposition: Admit to med/surg floor  Patient's condition is stable.         Amando Mullen DO  01/31/21 1407
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
## 2021-01-31 NOTE — PROGRESS NOTES
Perfect serve sent to wound care nurse regarding consult.   Electronically signed by Randi Myers RN on 1/31/2021 at 5:15 PM

## 2021-02-01 LAB
ANION GAP SERPL CALCULATED.3IONS-SCNC: 7 MMOL/L (ref 7–16)
ANTISTREPTOLYSIN-O: 200 IU/ML (ref 0–200)
BASOPHILS ABSOLUTE: 0.07 E9/L (ref 0–0.2)
BASOPHILS RELATIVE PERCENT: 0.7 % (ref 0–2)
BUN BLDV-MCNC: 15 MG/DL (ref 8–23)
CALCIUM SERPL-MCNC: 8.5 MG/DL (ref 8.6–10.2)
CHLORIDE BLD-SCNC: 104 MMOL/L (ref 98–107)
CO2: 29 MMOL/L (ref 22–29)
CREAT SERPL-MCNC: 0.8 MG/DL (ref 0.5–1)
EOSINOPHILS ABSOLUTE: 0.43 E9/L (ref 0.05–0.5)
EOSINOPHILS RELATIVE PERCENT: 4.5 % (ref 0–6)
GFR AFRICAN AMERICAN: >60
GFR NON-AFRICAN AMERICAN: >60 ML/MIN/1.73
GLUCOSE BLD-MCNC: 55 MG/DL (ref 74–99)
GRAM STAIN ORDERABLE: NORMAL
HCT VFR BLD CALC: 34 % (ref 34–48)
HEMOGLOBIN: 11.1 G/DL (ref 11.5–15.5)
IMMATURE GRANULOCYTES #: 0.17 E9/L
IMMATURE GRANULOCYTES %: 1.8 % (ref 0–5)
LYMPHOCYTES ABSOLUTE: 2.48 E9/L (ref 1.5–4)
LYMPHOCYTES RELATIVE PERCENT: 26 % (ref 20–42)
MCH RBC QN AUTO: 28.3 PG (ref 26–35)
MCHC RBC AUTO-ENTMCNC: 32.6 % (ref 32–34.5)
MCV RBC AUTO: 86.7 FL (ref 80–99.9)
METER GLUCOSE: 100 MG/DL (ref 74–99)
METER GLUCOSE: 120 MG/DL (ref 74–99)
METER GLUCOSE: 120 MG/DL (ref 74–99)
METER GLUCOSE: 124 MG/DL (ref 74–99)
METER GLUCOSE: 46 MG/DL (ref 74–99)
METER GLUCOSE: 52 MG/DL (ref 74–99)
METER GLUCOSE: 59 MG/DL (ref 74–99)
METER GLUCOSE: 77 MG/DL (ref 74–99)
MONOCYTES ABSOLUTE: 0.87 E9/L (ref 0.1–0.95)
MONOCYTES RELATIVE PERCENT: 9.1 % (ref 2–12)
NEUTROPHILS ABSOLUTE: 5.52 E9/L (ref 1.8–7.3)
NEUTROPHILS RELATIVE PERCENT: 57.9 % (ref 43–80)
PDW BLD-RTO: 14.2 FL (ref 11.5–15)
PLATELET # BLD: 362 E9/L (ref 130–450)
PMV BLD AUTO: 9.1 FL (ref 7–12)
POTASSIUM REFLEX MAGNESIUM: 3.8 MMOL/L (ref 3.5–5)
RBC # BLD: 3.92 E12/L (ref 3.5–5.5)
SODIUM BLD-SCNC: 140 MMOL/L (ref 132–146)
WBC # BLD: 9.5 E9/L (ref 4.5–11.5)

## 2021-02-01 PROCEDURE — 82962 GLUCOSE BLOOD TEST: CPT

## 2021-02-01 PROCEDURE — 6370000000 HC RX 637 (ALT 250 FOR IP): Performed by: INTERNAL MEDICINE

## 2021-02-01 PROCEDURE — 36415 COLL VENOUS BLD VENIPUNCTURE: CPT

## 2021-02-01 PROCEDURE — 80048 BASIC METABOLIC PNL TOTAL CA: CPT

## 2021-02-01 PROCEDURE — 86060 ANTISTREPTOLYSIN O TITER: CPT

## 2021-02-01 PROCEDURE — 2580000003 HC RX 258: Performed by: NURSE PRACTITIONER

## 2021-02-01 PROCEDURE — 6370000000 HC RX 637 (ALT 250 FOR IP): Performed by: SPECIALIST

## 2021-02-01 PROCEDURE — 6370000000 HC RX 637 (ALT 250 FOR IP): Performed by: NURSE PRACTITIONER

## 2021-02-01 PROCEDURE — 85025 COMPLETE CBC W/AUTO DIFF WBC: CPT

## 2021-02-01 PROCEDURE — 1200000000 HC SEMI PRIVATE

## 2021-02-01 PROCEDURE — 99232 SBSQ HOSP IP/OBS MODERATE 35: CPT | Performed by: INTERNAL MEDICINE

## 2021-02-01 RX ADMIN — ANORECTAL OINTMENT: 15.7; .44; 24; 20.6 OINTMENT TOPICAL at 16:19

## 2021-02-01 RX ADMIN — APIXABAN 2.5 MG: 2.5 TABLET, FILM COATED ORAL at 09:52

## 2021-02-01 RX ADMIN — ACETAMINOPHEN 650 MG: 325 TABLET ORAL at 00:39

## 2021-02-01 RX ADMIN — CEPHALEXIN 500 MG: 500 CAPSULE ORAL at 23:30

## 2021-02-01 RX ADMIN — SODIUM CHLORIDE, PRESERVATIVE FREE 10 ML: 5 INJECTION INTRAVENOUS at 23:41

## 2021-02-01 RX ADMIN — SODIUM CHLORIDE, PRESERVATIVE FREE 10 ML: 5 INJECTION INTRAVENOUS at 09:53

## 2021-02-01 RX ADMIN — PRAVASTATIN SODIUM 40 MG: 20 TABLET ORAL at 23:30

## 2021-02-01 RX ADMIN — CEPHALEXIN 500 MG: 500 CAPSULE ORAL at 14:25

## 2021-02-01 RX ADMIN — GUAIFENESIN AND DEXTROMETHORPHAN 5 ML: 100; 10 SYRUP ORAL at 23:30

## 2021-02-01 RX ADMIN — GLIPIZIDE 5 MG: 5 TABLET ORAL at 06:00

## 2021-02-01 RX ADMIN — AMLODIPINE BESYLATE 10 MG: 10 TABLET ORAL at 09:52

## 2021-02-01 RX ADMIN — ANORECTAL OINTMENT: 15.7; .44; 24; 20.6 OINTMENT TOPICAL at 23:42

## 2021-02-01 RX ADMIN — PANTOPRAZOLE SODIUM 40 MG: 40 TABLET, DELAYED RELEASE ORAL at 06:00

## 2021-02-01 RX ADMIN — MEMANTINE HYDROCHLORIDE 10 MG: 10 TABLET, FILM COATED ORAL at 23:30

## 2021-02-01 RX ADMIN — DEXTROSE MONOHYDRATE 12.5 G: 25 INJECTION, SOLUTION INTRAVENOUS at 07:28

## 2021-02-01 RX ADMIN — LEVOFLOXACIN 500 MG: 500 TABLET, FILM COATED ORAL at 16:19

## 2021-02-01 RX ADMIN — MEMANTINE HYDROCHLORIDE 10 MG: 10 TABLET, FILM COATED ORAL at 09:52

## 2021-02-01 RX ADMIN — MIRTAZAPINE 15 MG: 15 TABLET, FILM COATED ORAL at 23:31

## 2021-02-01 RX ADMIN — APIXABAN 2.5 MG: 2.5 TABLET, FILM COATED ORAL at 23:30

## 2021-02-01 RX ADMIN — CEPHALEXIN 500 MG: 500 CAPSULE ORAL at 06:00

## 2021-02-01 ASSESSMENT — PAIN DESCRIPTION - PROGRESSION: CLINICAL_PROGRESSION: NOT CHANGED

## 2021-02-01 ASSESSMENT — PAIN DESCRIPTION - LOCATION: LOCATION: BACK

## 2021-02-01 ASSESSMENT — PAIN DESCRIPTION - PAIN TYPE: TYPE: CHRONIC PAIN

## 2021-02-01 ASSESSMENT — PAIN DESCRIPTION - DESCRIPTORS: DESCRIPTORS: ACHING;DISCOMFORT

## 2021-02-01 ASSESSMENT — PAIN SCALES - GENERAL: PAINLEVEL_OUTOF10: 5

## 2021-02-01 ASSESSMENT — PAIN - FUNCTIONAL ASSESSMENT: PAIN_FUNCTIONAL_ASSESSMENT: ACTIVITIES ARE NOT PREVENTED

## 2021-02-01 NOTE — CARE COORDINATION
Social Work discharge planning   Pt is from [facility]. Sw called Suze Strong with admits at [facility], and she is calling Sw back with pt's bed hold status. Noted pt was negative Covid on 1/31/21. TRINI also called pt's legal guardian Tyronebibi Agosto, and she advised SW that plan IS for pt to return to [facility] at discharge. N17 started. Ambulance forms in folder for return. Social Work to follow for support and discharge planning with CM. Electronically signed by Caridad Rodriguez on 2/1/2021 at 2:21 PM     Addendum-    Per Suze Strong with [facility], they will need PT OT evals and daily updates for return. Suze Strong said they will submit all PT OT noted to insurance, but [facility] will not need to wait for insurance response with waiver. Pt was negative for Covid on 1/31. Suze Strong said they will not need a new Covid test if pt comes back in next few days. Social Work to follow for support and discharge planning with CM.   Electronically signed by Caridad Rodriguez on 2/2/2021 at 10:24 AM

## 2021-02-01 NOTE — PLAN OF CARE
Problem: Skin Integrity:  Goal: Will show no infection signs and symptoms  Description: Will show no infection signs and symptoms  Outcome: Met This Shift  Goal: Absence of new skin breakdown  Description: Absence of new skin breakdown  Outcome: Met This Shift     Problem: Falls - Risk of:  Goal: Will remain free from falls  Description: Will remain free from falls  Outcome: Met This Shift  Goal: Absence of physical injury  Description: Absence of physical injury  Outcome: Met This Shift     Problem: Injury - Risk of, Physical Injury:  Goal: Will remain free from falls  Description: Will remain free from falls  Outcome: Met This Shift  Goal: Absence of physical injury  Description: Absence of physical injury  Outcome: Met This Shift     Problem: Mood - Altered:  Goal: Mood stable  Description: Mood stable  Outcome: Met This Shift

## 2021-02-01 NOTE — FLOWSHEET NOTE
Inpatient Wound Care    Admit Date: 1/31/2021 11:40 AM    Reason for consult:  R leg    Significant history:  Admitted with cellulitis. History includes: DVT, HTN and dementia.      Wound history:  POA    Findings:       02/01/21 1327   Wound 01/31/21 Foot Left;Dorsal   Date First Assessed/Time First Assessed: 01/31/21 1531   Present on Hospital Admission: Yes  Location: Foot  Wound Location Orientation: Left;Dorsal   Wound Image    Dressing Status New dressing applied   Wound Cleansed Cleansed with saline   Dressing/Treatment Foam  (Xeroform gauze)   Dressing Change Due 02/02/21   Wound Length (cm) 5 cm   Wound Width (cm) 4 cm   Wound Surface Area (cm^2) 20 cm^2   Change in Wound Size % (l*w) -66.67   Wound Assessment   (red, raised lesion)   Drainage Amount Small   Drainage Description Serosanguinous   Odor None   Yaneli-wound Assessment Intact   Wound 01/31/21 Pretibial Proximal;Right   Date First Assessed/Time First Assessed: 01/31/21 1555   Present on Hospital Admission: Yes  Primary Wound Type: Traumatic  Location: Pretibial  Wound Location Orientation: Proximal;Right   Wound Image    Dressing Status New dressing applied   Wound Cleansed Cleansed with saline   Dressing/Treatment   (Adaptic, wound gel, opticell , ABD and kerlix)   Dressing Change Due 02/02/21   Wound Length (cm) 4 cm   Wound Width (cm) 2.5 cm   Wound Depth (cm)   (obscured)   Wound Surface Area (cm^2) 10 cm^2   Change in Wound Size % (l*w) 36.51   Wound Assessment Eschar dry   Drainage Amount None   Odor None   Yaneli-wound Assessment Dry/flaky;Erosion   Wound 01/31/21 Hip Anterior;Proximal;Right   Date First Assessed/Time First Assessed: 01/31/21 1559   Present on Hospital Admission: Yes  Primary Wound Type: Skin Tear  Location: Hip  Wound Location Orientation: Anterior;Proximal;Right   Wound Image    Dressing Status New dressing applied   Wound Cleansed Cleansed with saline   Dressing/Treatment   (Adaptic, wound gel, opticell, foam. )   Dressing Change Due 02/02/21   Wound Length (cm) 4 cm   Wound Width (cm) 3 cm   Wound Depth (cm) 0.2 cm   Wound Surface Area (cm^2) 12 cm^2   Change in Wound Size % (l*w) 23.81   Wound Volume (cm^3) 2.4 cm^3   Wound Assessment   (red, pink)   Drainage Amount Scant   Drainage Description Serosanguinous   Odor None   Yaneli-wound Assessment Intact   Wound 02/01/21 Heel Right   Date First Assessed/Time First Assessed: 02/01/21 1137   Present on Hospital Admission: Yes  Location: Heel  Wound Location Orientation: Right   Wound Image    Wound Etiology Deep tissue/Injury   Wound Length (cm) 1 cm   Wound Width (cm) 3 cm   Wound Surface Area (cm^2) 3 cm^2   Wound Assessment Purple/maroon   Drainage Amount None   Odor None       Impression:  R heel DTI, R lower leg wound appears from a laceration. Surrounding skin edematous, red, dry, painful. R hip healing wound , unknown etiology. L dorsal foot lesion. IAD     Interventions in place:  Wounds cleansed with NSS. Applied Xeroform gauze and foam to L foot lesion. R leg and hip cleansed then Adaptic, wound gel and opticell applied. Covered with ABD then secured with kerlix. Plan:Daily dressing changes, Aquaphor to lower extremities, Calmoseptine to buttocks, SOS precautions.        Dana Jaocb 2/1/2021 1:41 PM

## 2021-02-01 NOTE — PROGRESS NOTES
[address] [place] [address] [facility]  [facility]  Progress Note    SUBJECTIVE:  Chief Complaint   Patient presents with    Leg Swelling     right leg edema/redness worsening x1 week     Patient is tolerating medications. No reported adverse drug reactions. No nausea, vomiting, diarrhea. Afebrile. Room air. Right leg is dressed    Review of systems:  As stated above in the chief complaint, otherwise negative. Medications:  Scheduled Meds:   levoFLOXacin  500 mg Oral Daily    cephALEXin  500 mg Oral 3 times per day    amLODIPine  10 mg Oral Daily    apixaban  2.5 mg Oral BID    glipiZIDE  5 mg Oral QAM AC    memantine  10 mg Oral BID    mirtazapine  15 mg Oral Nightly    pantoprazole  40 mg Oral QAM AC    pravastatin  40 mg Oral Daily    sodium chloride flush  10 mL Intravenous 2 times per day    insulin lispro  0-6 Units Subcutaneous TID WC    insulin lispro  0-3 Units Subcutaneous Nightly     Continuous Infusions:   dextrose       PRN Meds:sodium chloride flush, promethazine **OR** ondansetron, polyethylene glycol, acetaminophen **OR** acetaminophen, glucose, dextrose, glucagon (rDNA), dextrose, guaiFENesin-dextromethorphan    OBJECTIVE:  BP (!) 115/56   Pulse 69   Temp 97.9 °F (36.6 °C) (Oral)   Resp 18   Ht 5' 4\" (1.626 m)   Wt 156 lb (70.8 kg)   SpO2 95%   BMI 26.78 kg/m²   Temp  Av.7 °F (36.5 °C)  Min: 97.6 °F (36.4 °C)  Max: 97.9 °F (36.6 °C)  Constitutional: The patient is awake, alert, and oriented to person. No distress   Skin: Warm and dry. No rashes were noted. - left foot growth     - right leg wound      - right leg     HEENT: Round and reactive pupils. Moist mucous membranes. No ulcerations or thrush. Neck: Supple to movements. Chest: No use of accessory muscles to breathe. Symmetrical expansion. No wheezing, crackles or rhonchi. Cardiovascular: S1 and S2 are rhythmic and regular. No murmurs appreciated. Abdomen: Positive bowel sounds to auscultation. Benign to palpation. No masses felt. No hepatosplenomegaly. Extremities: growth on the dorsum of the left foot with no erythema. The right leg is edematous, tender to touch, erythematous. The erythema extends from foot to knee. Lines: peripheral    Laboratory and Tests Review:  Lab Results   Component Value Date    WBC 9.5 02/01/2021    WBC 12.6 (H) 01/31/2021    WBC 10.0 05/26/2019    HGB 11.1 (L) 02/01/2021    HCT 34.0 02/01/2021    MCV 86.7 02/01/2021     02/01/2021     Lab Results   Component Value Date    NEUTROABS 5.52 02/01/2021    NEUTROABS 8.28 (H) 01/31/2021    NEUTROABS 5.17 05/26/2019     No results found for: CRPHS  Lab Results   Component Value Date    ALT 11 05/26/2019    AST 18 05/26/2019    ALKPHOS 113 (H) 05/26/2019    BILITOT 0.2 05/26/2019     Lab Results   Component Value Date     02/01/2021    K 3.8 02/01/2021     02/01/2021    CO2 29 02/01/2021    BUN 15 02/01/2021    CREATININE 0.8 02/01/2021    CREATININE 0.7 01/31/2021    CREATININE 0.6 05/26/2019    GFRAA >60 02/01/2021    LABGLOM >60 02/01/2021    GLUCOSE 55 02/01/2021    PROT 6.6 05/26/2019    LABALBU 3.6 05/26/2019    CALCIUM 8.5 02/01/2021    BILITOT 0.2 05/26/2019    ALKPHOS 113 05/26/2019    AST 18 05/26/2019    ALT 11 05/26/2019     Lab Results   Component Value Date    CRP 0.5 (H) 11/23/2016     Lab Results   Component Value Date    SEDRATE 121 (H) 01/31/2021    SEDRATE 43 (H) 11/23/2016     Radiology:  reviewed    Microbiology:   Blood cultures 1/31/2021: Negative so far  Wound culture 1/31/2021: Mixed GNR, GPO    ASSESSMENT:  · Cellulitis right leg. The port of entry seems to have been an open wound/skin tear over the right lateral proximal leg. It was treated with Doxycycline at the nursing facility for 3 days. If this were MRSA would have responded.   Most likely caused by beta-hemolytic Streptococcus or gram-negative regan  · Leukocytosis associated to the above  · Neoplastic lesion left foot    PLAN:  · Continue Levofloxacin 500mg PO Daily & Cephalexin 500mg PO TID  · Wound culture - pending  · Gram stain - PMN not seen, no organisms seen   · Blood cultures- pending   · Check final cultures  · Monitor labs    Daily Brown  12:50 PM  2/1/2021     Patient seen and examined. I had a face to face encounter with the patient. Agree with exam.  Assessment and plan as outlined above and directed by me. Addition and corrections were done as deemed appropriate. My exam and plan include: The patient seems to be tolerating antibiotics without any side effects. The right leg is still swollen. The erythema seems to be darker. There is still some oozing of serous fluid. We are still waiting for the results of the cultures.     Zhang Bales  2/1/2021  1:29 PM

## 2021-02-01 NOTE — PROGRESS NOTES
Patients blood sugar was 52 this morning. Orange juice given and rechecked was 46. Dextrose 50% 12.5g given.

## 2021-02-01 NOTE — PATIENT CARE CONFERENCE
[facility] Quality Flow/Interdisciplinary Rounds Progress Note        Quality Flow Rounds held on February 1, 2021    Disciplines Attending:  Bedside Nurse, ,  and Nursing Unit Leadership    Tony Rubio was admitted on 1/31/2021 11:40 AM    Anticipated Discharge Date:  Expected Discharge Date: 02/02/21    Disposition:    Ravindra Score:  Ravindra Scale Score: 12    Readmission Risk              Risk of Unplanned Readmission:        12           Discussed patient goal for the day, patient clinical progression, and barriers to discharge.   The following Goal(s) of the Day/Commitment(s) have been identified:  Diagnostics - Report Results and Labs - Report Results      Rafal Cobb  February 1, 2021

## 2021-02-01 NOTE — PROGRESS NOTES
[facility] Hospitalist   Progress Note    Admitting Date and Time: 1/31/2021 11:40 AM  Admit Dx: Cellulitis [L03.90]     Seen for follow on rt leg cellulitis    Subjective:  Denies CP ,sob or abd pain. D/w nursing , uneventful night. Worried about rt leg infection. ROS: denies fever, chills, cp, sob, n/v, HA unless stated above.      menthol-zinc oxide   Topical BID    levoFLOXacin  500 mg Oral Daily    cephALEXin  500 mg Oral 3 times per day    amLODIPine  10 mg Oral Daily    apixaban  2.5 mg Oral BID    glipiZIDE  5 mg Oral QAM AC    memantine  10 mg Oral BID    mirtazapine  15 mg Oral Nightly    pantoprazole  40 mg Oral QAM AC    pravastatin  40 mg Oral Daily    sodium chloride flush  10 mL Intravenous 2 times per day    insulin lispro  0-6 Units Subcutaneous TID WC    insulin lispro  0-3 Units Subcutaneous Nightly         white petrolatum, , Daily PRN      sodium chloride flush, 10 mL, PRN      promethazine, 12.5 mg, Q6H PRN    Or      ondansetron, 4 mg, Q6H PRN      polyethylene glycol, 17 g, Daily PRN      acetaminophen, 650 mg, Q6H PRN    Or      acetaminophen, 650 mg, Q6H PRN      glucose, 15 g, PRN      dextrose, 12.5 g, PRN      glucagon (rDNA), 1 mg, PRN      dextrose, 100 mL/hr, PRN      guaiFENesin-dextromethorphan, 5 mL, Q6H PRN         Objective:    BP (!) 115/56   Pulse 69   Temp 97.9 °F (36.6 °C) (Oral)   Resp 18   Ht 5' 4\" (1.626 m)   Wt 156 lb (70.8 kg)   SpO2 95%   BMI 26.78 kg/m²   General Appearance: alert and oriented , pleasantly confused sec to dementia ,in no acute distress  Skin: warm and dry  Head: normocephalic and atraumatic  Eyes: pupils equal, round, and reactive to light, extraocular eye movements intact, conjunctivae normal  Neck: supple and non-tender without mass  Pulmonary/Chest: clear to auscultation bilaterally  Cardiovascular: normal rate,normal S1 and S2  Abdomen: soft, non-tender, non-distended, normal bowel sounds, no masses

## 2021-02-02 VITALS
HEART RATE: 85 BPM | HEIGHT: 64 IN | BODY MASS INDEX: 26.92 KG/M2 | TEMPERATURE: 98.7 F | RESPIRATION RATE: 16 BRPM | WEIGHT: 157.7 LBS | DIASTOLIC BLOOD PRESSURE: 63 MMHG | SYSTOLIC BLOOD PRESSURE: 137 MMHG | OXYGEN SATURATION: 98 %

## 2021-02-02 LAB
ANION GAP SERPL CALCULATED.3IONS-SCNC: 7 MMOL/L (ref 7–16)
BASOPHILS ABSOLUTE: 0.08 E9/L (ref 0–0.2)
BASOPHILS RELATIVE PERCENT: 0.7 % (ref 0–2)
BUN BLDV-MCNC: 12 MG/DL (ref 8–23)
CALCIUM SERPL-MCNC: 8.4 MG/DL (ref 8.6–10.2)
CHLORIDE BLD-SCNC: 103 MMOL/L (ref 98–107)
CO2: 30 MMOL/L (ref 22–29)
CREAT SERPL-MCNC: 0.7 MG/DL (ref 0.5–1)
EOSINOPHILS ABSOLUTE: 0.5 E9/L (ref 0.05–0.5)
EOSINOPHILS RELATIVE PERCENT: 4.4 % (ref 0–6)
GFR AFRICAN AMERICAN: >60
GFR NON-AFRICAN AMERICAN: >60 ML/MIN/1.73
GLUCOSE BLD-MCNC: 124 MG/DL (ref 74–99)
HBA1C MFR BLD: 8.2 % (ref 4–5.6)
HCT VFR BLD CALC: 33.5 % (ref 34–48)
HEMOGLOBIN: 10.9 G/DL (ref 11.5–15.5)
IMMATURE GRANULOCYTES #: 0.18 E9/L
IMMATURE GRANULOCYTES %: 1.6 % (ref 0–5)
LYMPHOCYTES ABSOLUTE: 3.33 E9/L (ref 1.5–4)
LYMPHOCYTES RELATIVE PERCENT: 29.5 % (ref 20–42)
MCH RBC QN AUTO: 28.3 PG (ref 26–35)
MCHC RBC AUTO-ENTMCNC: 32.5 % (ref 32–34.5)
MCV RBC AUTO: 87 FL (ref 80–99.9)
METER GLUCOSE: 133 MG/DL (ref 74–99)
METER GLUCOSE: 180 MG/DL (ref 74–99)
METER GLUCOSE: 224 MG/DL (ref 74–99)
MONOCYTES ABSOLUTE: 0.95 E9/L (ref 0.1–0.95)
MONOCYTES RELATIVE PERCENT: 8.4 % (ref 2–12)
NEUTROPHILS ABSOLUTE: 6.25 E9/L (ref 1.8–7.3)
NEUTROPHILS RELATIVE PERCENT: 55.4 % (ref 43–80)
PDW BLD-RTO: 14.4 FL (ref 11.5–15)
PLATELET # BLD: 415 E9/L (ref 130–450)
PMV BLD AUTO: 9 FL (ref 7–12)
POTASSIUM SERPL-SCNC: 4.3 MMOL/L (ref 3.5–5)
RBC # BLD: 3.85 E12/L (ref 3.5–5.5)
SODIUM BLD-SCNC: 140 MMOL/L (ref 132–146)
WBC # BLD: 11.3 E9/L (ref 4.5–11.5)
WOUND/ABSCESS: NORMAL

## 2021-02-02 PROCEDURE — 2580000003 HC RX 258: Performed by: NURSE PRACTITIONER

## 2021-02-02 PROCEDURE — 36415 COLL VENOUS BLD VENIPUNCTURE: CPT

## 2021-02-02 PROCEDURE — 97165 OT EVAL LOW COMPLEX 30 MIN: CPT

## 2021-02-02 PROCEDURE — 97161 PT EVAL LOW COMPLEX 20 MIN: CPT

## 2021-02-02 PROCEDURE — 83036 HEMOGLOBIN GLYCOSYLATED A1C: CPT

## 2021-02-02 PROCEDURE — 6370000000 HC RX 637 (ALT 250 FOR IP): Performed by: SPECIALIST

## 2021-02-02 PROCEDURE — 85025 COMPLETE CBC W/AUTO DIFF WBC: CPT

## 2021-02-02 PROCEDURE — 82962 GLUCOSE BLOOD TEST: CPT

## 2021-02-02 PROCEDURE — 6370000000 HC RX 637 (ALT 250 FOR IP): Performed by: NURSE PRACTITIONER

## 2021-02-02 PROCEDURE — 80048 BASIC METABOLIC PNL TOTAL CA: CPT

## 2021-02-02 PROCEDURE — 99238 HOSP IP/OBS DSCHRG MGMT 30/<: CPT | Performed by: INTERNAL MEDICINE

## 2021-02-02 RX ORDER — CEPHALEXIN 500 MG/1
500 CAPSULE ORAL EVERY 8 HOURS SCHEDULED
Qty: 30 CAPSULE | Refills: 0 | DISCHARGE
Start: 2021-02-02 | End: 2021-02-12

## 2021-02-02 RX ORDER — LEVOFLOXACIN 500 MG/1
500 TABLET, FILM COATED ORAL DAILY
Qty: 10 TABLET | Refills: 0 | DISCHARGE
Start: 2021-02-02 | End: 2021-02-12

## 2021-02-02 RX ADMIN — PRAVASTATIN SODIUM 40 MG: 20 TABLET ORAL at 19:38

## 2021-02-02 RX ADMIN — PANTOPRAZOLE SODIUM 40 MG: 40 TABLET, DELAYED RELEASE ORAL at 07:30

## 2021-02-02 RX ADMIN — AMLODIPINE BESYLATE 10 MG: 10 TABLET ORAL at 08:50

## 2021-02-02 RX ADMIN — ANORECTAL OINTMENT: 15.7; .44; 24; 20.6 OINTMENT TOPICAL at 21:46

## 2021-02-02 RX ADMIN — MEMANTINE HYDROCHLORIDE 10 MG: 10 TABLET, FILM COATED ORAL at 08:50

## 2021-02-02 RX ADMIN — SODIUM CHLORIDE, PRESERVATIVE FREE 10 ML: 5 INJECTION INTRAVENOUS at 08:50

## 2021-02-02 RX ADMIN — MEMANTINE HYDROCHLORIDE 10 MG: 10 TABLET, FILM COATED ORAL at 19:38

## 2021-02-02 RX ADMIN — INSULIN LISPRO 1 UNITS: 100 INJECTION, SOLUTION INTRAVENOUS; SUBCUTANEOUS at 11:21

## 2021-02-02 RX ADMIN — APIXABAN 2.5 MG: 2.5 TABLET, FILM COATED ORAL at 19:38

## 2021-02-02 RX ADMIN — ANORECTAL OINTMENT: 15.7; .44; 24; 20.6 OINTMENT TOPICAL at 08:50

## 2021-02-02 RX ADMIN — CEPHALEXIN 500 MG: 500 CAPSULE ORAL at 21:29

## 2021-02-02 RX ADMIN — INSULIN LISPRO 1 UNITS: 100 INJECTION, SOLUTION INTRAVENOUS; SUBCUTANEOUS at 19:39

## 2021-02-02 RX ADMIN — CEPHALEXIN 500 MG: 500 CAPSULE ORAL at 16:28

## 2021-02-02 RX ADMIN — MIRTAZAPINE 15 MG: 15 TABLET, FILM COATED ORAL at 19:38

## 2021-02-02 RX ADMIN — APIXABAN 2.5 MG: 2.5 TABLET, FILM COATED ORAL at 08:50

## 2021-02-02 RX ADMIN — LEVOFLOXACIN 500 MG: 500 TABLET, FILM COATED ORAL at 16:28

## 2021-02-02 RX ADMIN — CEPHALEXIN 500 MG: 500 CAPSULE ORAL at 07:30

## 2021-02-02 ASSESSMENT — PAIN SCALES - GENERAL: PAINLEVEL_OUTOF10: 0

## 2021-02-02 NOTE — PROGRESS NOTES
Physical Therapy    Facility/Department: [facility] MED SURG  Initial Assessment    NAME: Nicholas Vasquez  : 1935  MRN: [id number]    Date of Service: 2021      Attending Provider:  Brigette Tai MD    Evaluating PT:  Maile Cox, P.T. Room #:  7681/5770-E  Diagnosis:  Cellulitis, RLE edema/redness  Pertinent PMHx/PSHx:  RUE amputation above the elbow, dementia  Precautions:  FALLS, bed/chair alarm    SUBJECTIVE:    Pt lives at [facility]. According to pt she does not walk, but was able to stand up with assist if there was a grab bar in front of her to hold onto. OBJECTIVE:   Initial Evaluation  Date: 21 Treatment Short Term/ Long Term   Goals   Was pt agreeable to Eval/treatment? yes     Does pt have pain? C/o RLE pain with attempted movement     Bed Mobility  Rolling: MAX A  Supine to sit: MAX A x2  Sit to supine: MAX A x2  Scooting: Dependent x2 toward HOB. MIN A   Transfers Sit to stand: NA, pt unable at this time. Stand to sit: NA  Stand pivot: NA  MOD A   Ambulation   NA  NA   WC propulsion NA  50 feet using BLE and LUE with SBA   AM-PAC 6 Clicks        LLE ROM is WFL and RLE is limited DF, knee, and hip flex due to increased tone and resistance due to c/o increased RLE pain with movement attempt. LLE strength is grossly 2/5 to 3-/5 and RLE NA due to pain. Edema:  RLE  Balance: sitting is MAX A x2 1/2 up to EOB due to c/o increased pain RLE with increased extensor tone RLE and trunk causing COG to travel posterior outside her MICKY. Endurance: poor    Patient education  Pt educated on bed mobility. Patient response to education:   Pt verbalized understanding Pt demonstrated skill Pt requires further education in this area   yes no yes     ASSESSMENT:    Comments:  Pt has decreased strength and endurance and increased RLE pain and extensor tone that is limiting functional mobility at this time.   While attempting to get to EOB she c/o RLE pain and had an increase in extensor tone causing her to push posterior outside her MICKY and increases her risk of sliding off EOB. Despite VCs and assist she was unable to sit all the way up and was assisted back to bed. Pt was left supine in bed L 1/4 turn as found with call light left by patient. Chair/bed alarm: bed alarm was re-activated. Pt's/ family goals   1. To decrease RLE pain. Patient and or family understand(s) diagnosis, prognosis, and plan of care. PLAN OF CARE:    Current Treatment Recommendations      [x] Strengthening     [x] ROM   [x] Balance Training   [] Endurance Training   [x] Transfer Training   [x] WC Training   [] Stair Training   [] Positioning   [] Safety and Education Training   [] Patient/Caregiver Education   [] HEP  [] Other     PT care will be provided in accordance with the objectives noted above. Exercises and functional mobility practice will be used as well as appropriate assistive devices or modalities to obtain goals. Patient and family education will also be administered as needed. Frequency of treatments: 2-5x/week x 1-2 weeks. Time in  14:00  Time out  14:20    Evaluation Time includes thorough review of current medical information, gathering information on past medical history/social history and prior level of function, completion of standardized testing/informal observation of tasks, assessment of data and education on plan of care and goals. CPT codes:  [x] Low Complexity PT evaluation 30815  [] Moderate Complexity PT evaluation 77139  [] High Complexity PT evaluation 51110  [] PT Re-evaluation 07752  [] Gait training 75728 ** minutes  [] Manual therapy 91076 ** minutes  [] Therapeutic activities 77243 ** minutes  [] Therapeutic exercises 26387 ** minutes  [] Neuromuscular reeducation 84142 ** minutes     Anson Jo, P.T.   License Number: [id number]

## 2021-02-02 NOTE — PROGRESS NOTES
[address] [place] [address] [facility]  Progress Note  Face to face encounter   SUBJECTIVE:  Chief Complaint   Patient presents with    Leg Swelling     right leg edema/redness worsening x1 week     Patient is tolerating medications. No reported adverse drug reactions. No nausea, vomiting, diarrhea. Afebrile. Room air. Right leg is dressed. No pain. In bed- no acute distress. Review of systems:  As stated above in the chief complaint, otherwise negative. Medications:  Scheduled Meds:   menthol-zinc oxide   Topical BID    levoFLOXacin  500 mg Oral Daily    cephALEXin  500 mg Oral 3 times per day    amLODIPine  10 mg Oral Daily    apixaban  2.5 mg Oral BID    [Held by provider] glipiZIDE  5 mg Oral QAM AC    memantine  10 mg Oral BID    mirtazapine  15 mg Oral Nightly    pantoprazole  40 mg Oral QAM AC    pravastatin  40 mg Oral Daily    sodium chloride flush  10 mL Intravenous 2 times per day    insulin lispro  0-6 Units Subcutaneous TID WC    insulin lispro  0-3 Units Subcutaneous Nightly     Continuous Infusions:   dextrose       PRN Meds:white petrolatum, sodium chloride flush, promethazine **OR** ondansetron, polyethylene glycol, acetaminophen **OR** acetaminophen, glucose, dextrose, glucagon (rDNA), dextrose, guaiFENesin-dextromethorphan    OBJECTIVE:  BP (!) 109/55   Pulse 78   Temp 99 °F (37.2 °C) (Oral)   Resp 16   Ht 5' 4\" (1.626 m)   Wt 157 lb 11.2 oz (71.5 kg)   SpO2 92%   BMI 27.07 kg/m²   Temp  Av.6 °F (37 °C)  Min: 98.2 °F (36.8 °C)  Max: 99 °F (37.2 °C)  Constitutional: The patient is awake, alert, and oriented to person. No distress   Skin: Warm and dry. No rashes were noted. - left foot growth     - right leg wound      - right leg     HEENT: Round and reactive pupils. Moist mucous membranes. No ulcerations or thrush. Neck: Supple to movements. Chest: No use of accessory muscles to breathe. Symmetrical expansion.   No wheezing, crackles or rhonchi. Cardiovascular: S1 and S2 are rhythmic and regular. No murmurs appreciated. Abdomen: Positive bowel sounds to auscultation. Benign to palpation. Extremities: growth on the dorsum of the left foot with no erythema. The right leg is edematous, not as tender to touch today, seems darker erythema today. The erythema extends from foot to knee- improving   Lines: peripheral    Laboratory and Tests Review:  Lab Results   Component Value Date    WBC 11.3 02/02/2021    WBC 9.5 02/01/2021    WBC 12.6 (H) 01/31/2021    HGB 10.9 (L) 02/02/2021    HCT 33.5 (L) 02/02/2021    MCV 87.0 02/02/2021     02/02/2021     Lab Results   Component Value Date    NEUTROABS 6.25 02/02/2021    NEUTROABS 5.52 02/01/2021    NEUTROABS 8.28 (H) 01/31/2021     No results found for: CRPHS  Lab Results   Component Value Date    ALT 11 05/26/2019    AST 18 05/26/2019    ALKPHOS 113 (H) 05/26/2019    BILITOT 0.2 05/26/2019     Lab Results   Component Value Date     02/02/2021    K 4.3 02/02/2021    K 3.8 02/01/2021     02/02/2021    CO2 30 02/02/2021    BUN 12 02/02/2021    CREATININE 0.7 02/02/2021    CREATININE 0.8 02/01/2021    CREATININE 0.7 01/31/2021    GFRAA >60 02/02/2021    LABGLOM >60 02/02/2021    GLUCOSE 124 02/02/2021    PROT 6.6 05/26/2019    LABALBU 3.6 05/26/2019    CALCIUM 8.4 02/02/2021    BILITOT 0.2 05/26/2019    ALKPHOS 113 05/26/2019    AST 18 05/26/2019    ALT 11 05/26/2019     Lab Results   Component Value Date    CRP 0.5 (H) 11/23/2016     Lab Results   Component Value Date    SEDRATE 121 (H) 01/31/2021    SEDRATE 43 (H) 11/23/2016     Radiology:  reviewed    Microbiology:   Blood cultures 1/31/2021: Negative   Wound culture 1/31/2021: Mixed GNR, GPO    ASSESSMENT:  · Cellulitis right leg. The port of entry seems to have been an open wound/skin tear over the right lateral proximal leg. It was treated with Doxycycline at the nursing facility for 3 days. If this were MRSA would have responded.

## 2021-02-02 NOTE — CARE COORDINATION
Social Work discharge planning addendum-    Pt to [address] today at [address] [facility] via [facility] [phone], Sw spoke to Zeny Vail at [facility]. Willie Parmar with SNF, pt's guardian Derek Low at [facility] ([facility]), charge RN Jie Qiu all aware.    Electronically signed by Dejah Sanchez on 2/2/2021 at 4:08 PM

## 2021-02-02 NOTE — PROGRESS NOTES
Spoke with Jeremie Zavala at [facility]. Paperwork was faxed to [phone], provided by Jeremie Zavala. She was also notified about pt's lantus and glipizide being stopped per our physician order due to low blood sugars during stay.  Electronically signed by Ez Sheth RN on 2/2/2021 at 4:57 PM

## 2021-02-02 NOTE — PROGRESS NOTES
demonstrated  decreased independence and safety during completion of ADL/functional transfer/mobility tasks. Pt would benefit from continued skilled OT to increase safety and independence with completion of ADL/IADL tasks for functional independence and quality of life. Assessment of current deficits   Functional mobility [x]  ADLs [x] Strength [x]  Cognition []  Functional transfers  [x] IADLs [x] Safety Awareness [x]  Endurance [x]  Fine Motor Coordination [] Balance [x] Vision/perception [] Sensation []   Gross Motor Coordination [] ROM [] Delirium []                  Motor Control []       Plan of Care: 1-3 days/week for 1-2 weeks PRN   [x]ADL retraining/adapted techniques and AE recommendations to increase functional independence within precautions                    [x]Energy conservation techniques to improve tolerance for selfcare routine   [x]Functional transfer/mobility training/DME recommendations for increased independence, safety and fall prevention         [x]Patient/family education to increase safety and functional independence             [x]Environmental modifications for safe mobility and completion of ADLs                             []Cognitive retraining ex's to improve problem solving skills & safe participation in ADLs/IADLs     []Sensory re-education techniques to improve extremity awareness, maintain skin integrity and improve hand function                             []Visual/Perceptual retraining  to improve body awareness and safety during transfers and ADLs  []Splinting/positioning needs to maintain joint/skin integrity and prevent contractures  [x]Therapeutic activity to improve functional performance during ADLs. [x]Therapeutic exercise to improve tolerance and functional strength for ADLs   [x]Balance retraining/tolerance tasks for facilitation of postural control with dynamic challenges during ADLs .   []Neuromuscular re-education: facilitation of righting/equilibrium reactions, midline orientation, scapular stability/mobility, Normalization muscle     tone and facilitation active functional movement/Attention                         []Delirium prevention/treatment    [x]Positioning to improve functional independence  []Other:       Rehab Potential:  Good for established goals     Patient / Family Goal: none stated       Patient and/or family were instructed on functional diagnosis, prognosis/goals and OT plan of care. Demonstrated fair  understanding. Eval Complexity: Low      Time In:1405  Time Out: 1425        Min Units   OT Eval Low 97165  x 1   OT Eval Medium 35227      OT Eval High 98959       OT Re-Eval [id number]       Therapeutic Ex 96217       Therapeutic Activities 36114       ADL/Self Care 91185       Orthotic Management 61234       Neuro Re-Ed 60631       Non-Billable Time          Evaluation Time includes thorough review of current medical information, gathering information on past medical history/social history and prior level of function, completion of standardized testing/informal observation of tasks, assessment of data and education on plan of care and goals.             Hammad Myers OTR/L [id number]

## 2021-02-02 NOTE — PROGRESS NOTES
[facility] Hospitalist   Progress Note    Admitting Date and Time: 1/31/2021 11:40 AM  Admit Dx: Cellulitis [L03.90]     Seen for follow on rt leg cellulitis    Subjective:  Denies CP ,sob or abd pain. No n/v/d. Po intake poor , says does not like food over here. Lantus & Glipizide on hold. BS improving - currently covered with ISS. ROS: denies fever, chills, cp, sob, n/v, HA unless stated above.      menthol-zinc oxide   Topical BID    levoFLOXacin  500 mg Oral Daily    cephALEXin  500 mg Oral 3 times per day    amLODIPine  10 mg Oral Daily    apixaban  2.5 mg Oral BID    [Held by provider] glipiZIDE  5 mg Oral QAM AC    memantine  10 mg Oral BID    mirtazapine  15 mg Oral Nightly    pantoprazole  40 mg Oral QAM AC    pravastatin  40 mg Oral Daily    sodium chloride flush  10 mL Intravenous 2 times per day    insulin lispro  0-6 Units Subcutaneous TID WC    insulin lispro  0-3 Units Subcutaneous Nightly         white petrolatum, , Daily PRN      sodium chloride flush, 10 mL, PRN      promethazine, 12.5 mg, Q6H PRN    Or      ondansetron, 4 mg, Q6H PRN      polyethylene glycol, 17 g, Daily PRN      acetaminophen, 650 mg, Q6H PRN    Or      acetaminophen, 650 mg, Q6H PRN      glucose, 15 g, PRN      dextrose, 12.5 g, PRN      glucagon (rDNA), 1 mg, PRN      dextrose, 100 mL/hr, PRN      guaiFENesin-dextromethorphan, 5 mL, Q6H PRN         Objective:    BP (!) 109/55   Pulse 78   Temp 99 °F (37.2 °C) (Oral)   Resp 16   Ht 5' 4\" (1.626 m)   Wt 157 lb 11.2 oz (71.5 kg)   SpO2 92%   BMI 27.07 kg/m²   General Appearance: alert and oriented , pleasantly confused sec to dementia ,in no acute distress  Skin: warm and dry  Head: normocephalic and atraumatic  Eyes: pupils equal, round, and reactive to light, extraocular eye movements intact, conjunctivae normal  Neck: supple and non-tender without mass  Pulmonary/Chest: clear to auscultation bilaterally  Cardiovascular: normal rate,normal S1 and S2  Abdomen: soft, non-tender, non-distended, normal bowel sounds, no masses or organomegaly  Extremities: no cyanosis, clubbing rt leg with dressing   Musculoskeletal: normal range of motion  Neurologic:no cranial nerve deficit,speech normal      Recent Labs     01/31/21  1153 02/01/21  0640 02/02/21  0405    140 140   K 3.8 3.8 4.3   CL 98 104 103   CO2 27 29 30*   BUN 17 15 12   CREATININE 0.7 0.8 0.7   GLUCOSE 193* 55* 124*   CALCIUM 9.1 8.5* 8.4*       Recent Labs     01/31/21  1153 02/01/21  0640 02/02/21  0405   WBC 12.6* 9.5 11.3   RBC 4.17 3.92 3.85   HGB 11.9 11.1* 10.9*   HCT 35.9 34.0 33.5*   MCV 86.1 86.7 87.0   MCH 28.5 28.3 28.3   MCHC 33.1 32.6 32.5   RDW 14.0 14.2 14.4    362 415   MPV 9.1 9.1 9.0       Labs and images reviewed     Radiology:   No orders to display       Assessment:    Active Problems:    Cellulitis  Resolved Problems:    * No resolved hospital problems. *      Plan:  RLE cellulitis & Rt le wound  - failed out pt rx , was treated with doxy . In Er received IV Zosyn + VM . ID consulted and following. On po keflex /levaq as per ID. Continue local care. Continue to follow cxs. ID & WC following. As per ID can be discharge on Kelfex/Levaq po for 10 days. HTN - resume as per home    HPL - on statin    DMII - BS low this am , Lantus, glipizide  on hold ,on ISS,Monitor blood sugars Provider, please review the patient's drug allergy history. Some issues need clarification. HbA1c     Hx dvt - on eliquis    Dementia - on Namenda    On eliquis so ok for dvt [facility]       PT/OT/CM following on disch planning.       Electronically signed by Rosas Steiner MD on 2/2/2021 at 3:40 PM

## 2021-02-02 NOTE — DISCHARGE SUMMARY
[facility] EMERGENCY SERVICE Physician Discharge Summary       [address] [facility] [address]  [address], [address] [address]  [phone]    Schedule an appointment as soon as possible for a visit in 4 weeks        Activity level: As tolerated    Diet: DIET CARB CONTROL;    Dispo: To SNF     Condition on Discharge - stable     Patient ID:  Aide Major  [id number]  38 y.o.  1935    Admit date: 1/31/2021    Discharge date and time:  2/2/2021  4:27 PM    Admission Diagnoses: Active Problems:    Cellulitis  Resolved Problems:    * No resolved hospital problems. *      Discharge Diagnoses: Active Problems:    Cellulitis  Resolved Problems:    * No resolved hospital problems. *      Consults:  IP CONSULT TO INFECTIOUS DISEASES  IP CONSULT TO INFECTIOUS DISEASES  IP CONSULT TO DIETITIAN  IP CONSULT TO [facility] Course:     She is an 81-year-old female with past medical history of hypertension, hyperlipidemia, GERD, type 2 diabetes, dementia, DVT, moderate protein calorie malnutrition came from local skilled nursing facility with  worsening erythema, swelling and pain to right lower extremity. She was treated with Doxy prior to arrival.  Because of the progression of cellulitis spreading up to to the knee and down to the ankle she was sent to ER for evaluation. She  has open wound on right lateral leg, wound cultures were sent from ER. She was given IV vancomycin and Zosyn in ER. She was admitted on regular nursing floor. ID & WC  was consulted. She was managed as below. RLE cellulitis & Rt le wound  - failed out pt rx , was treated with doxy . In Er received IV Zosyn + VM . ID was consulted and following. On po keflex /levaq as per ID. Continue local care. Wound cultures with mixed radhika . ID & WC were  following. As per ID she can be discharge on Kelfex/Levaq po for 10 days. Continue local care as advised.     HTN - resume as per home     HPL - on statin     DMII -as per nursing home she is on Levemir 30 units subcu twice daily , Glucotrol 5 mg daily. Over here she did not like the food ,over here blood sugars were on lower side, so Lantus and Glucotrol were discontinued. Diabetes was managed by sliding scale. Following this her blood sugars were monitored and were stable. I have discussed with bed side nurse-to let accepting facility know. to monitor blood sugar and when uptrending reinstate glipizide/Levemir. Needs close monitoring.      Hx dvt - on eliquis     Dementia - on Namenda       Discharge Exam:  Vitals:    02/01/21 0751 02/01/21 2315 02/02/21 0549 02/02/21 0845   BP: (!) 115/56 124/60  (!) 109/55   Pulse: 69 70  78   Resp: 18 16  16   Temp: 97.9 °F (36.6 °C) 98.2 °F (36.8 °C)  99 °F (37.2 °C)   TempSrc: Oral Oral  Oral   SpO2: 95% 93%  92%   Weight:   157 lb 11.2 oz (71.5 kg)    Height:         For PE see  Progress note dated today -  In Epic      LABS:  Recent Labs     01/31/21  1153 02/01/21  0640 02/02/21  0405    140 140   K 3.8 3.8 4.3   CL 98 104 103   CO2 27 29 30*   BUN 17 15 12   CREATININE 0.7 0.8 0.7   GLUCOSE 193* 55* 124*   CALCIUM 9.1 8.5* 8.4*       Recent Labs     01/31/21  1153 02/01/21  0640 02/02/21  0405   WBC 12.6* 9.5 11.3   RBC 4.17 3.92 3.85   HGB 11.9 11.1* 10.9*   HCT 35.9 34.0 33.5*   MCV 86.1 86.7 87.0   MCH 28.5 28.3 28.3   MCHC 33.1 32.6 32.5   RDW 14.0 14.2 14.4    362 415   MPV 9.1 9.1 9.0       No results for input(s): POCGLU in the last 72 hours.       Imaging:   No orders to display       Patient Instructions:      Medication List      START taking these medications    cephALEXin 500 MG capsule  Commonly known as: KEFLEX  Take 1 capsule by mouth every 8 hours for 10 days     levoFLOXacin 500 MG tablet  Commonly known as: LEVAQUIN  Take 1 tablet by mouth daily for 10 days        CONTINUE taking these medications    acetaminophen 325 MG tablet  Commonly known as: TYLENOL     amLODIPine 10 MG tablet  Commonly known as: NORVASC     ammonium lactate 12 % lotion  Commonly known as: LAC-HYDRIN     Artificial Tears 0.4 % Soln ophthalmic solution  Generic drug: hypromellose     DULCOLAX RE     Eliquis 2.5 MG Tabs tablet  Generic drug: apixaban     fleet 7-19 GM/118ML     insulin lispro 100 UNIT/ML injection vial  Commonly known as: HUMALOG     magnesium hydroxide 400 MG/5ML suspension  Commonly known as: MILK OF MAGNESIA     mirtazapine 15 MG tablet  Commonly known as: REMERON     Namenda XR 28 MG Cp24 extended release capsule  Generic drug: memantine ER     pantoprazole 40 MG tablet  Commonly known as: PROTONIX  Take 1 tablet by mouth every morning (before breakfast)     pravastatin 40 MG tablet  Commonly known as: PRAVACHOL     Robitussin Cold Cough+ Chest  MG/5ML syrup  Generic drug: dextromethorphan-guaiFENesin        STOP taking these medications    glipiZIDE 5 MG tablet  Commonly known as: GLUCOTROL     Levemir 100 UNIT/ML injection vial  Generic drug: insulin detemir           Where to Get Your Medications      Information about where to get these medications is not yet available    Ask your nurse or doctor about these medications  · cephALEXin 500 MG capsule  · levoFLOXacin 500 MG tablet           Note that  30 minutes was spent in preparing discharge papers, discussing discharge with patient, medication review, etc.    Signed:  Electronically signed by Mackenzie Ojeda MD on 2/2/2021 at 4:27 PM    NOTE: This report was transcribed using voice recognition software. Every effort was made to ensure accuracy; however, inadvertent computerized transcription errors may be present.

## 2021-02-02 NOTE — DISCHARGE INSTR - COC
Continuity of Care Form    Patient Name: Jalen Stern   :  1935  MRN:  [id number]    Admit date:  2021  Discharge date:  21    Code Status Order: [facility]   Advance Directives:   [facility] Documentation     Date/Time Healthcare Directive Type of Healthcare Directive Copy in [facility] [address] Agent's Name Healthcare Agent's Phone Number    21 1622  Yes, patient has an advance directive for healthcare treatment  --  --  --  --  --          Admitting Physician:  Liam Brewster MD  PCP: Jannet Gottlieb MD    Discharging Nurse: Electronically signed by Ron Howard RN on 2021 at 4:33 PM    [address] Unit/Room#: [facility]  Discharging Unit Phone Number: [phone]  Emergency Contact:   Extended Emergency Contact Information  Primary Emergency Contact: [address] [place] [address] Phone: [phone]  Mobile Phone: [phone]  Relation: Legal Guardian  Preferred language: English   needed?  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 Andrzje Scott Right 2016    REMOVAL OLD HARDWARE RIGHT HIP;TOTAL RIGHT HIP ARTHROPLASTY    VENA CAVA FILTER PLACEMENT  2016    Aby Lozoya    VENA CAVA FILTER PLACEMENT Right 2016       Immunization History:   Immunization History   Administered Date(s) Administered    Pneumococcal Polysaccharide (Adakrhsvu66) 2015    Td, unspecified formulation 2015       Active Problems:  Patient Active Problem List   Diagnosis Code    Hyperlipidemia E78.5    Dementia ([address] F03.90    GERD (gastroesophageal reflux disease) K21.9    D.M. 2 1327   Number of days: 1       Wound 01/31/21 Pretibial Proximal;Right (Active)   Wound Image   02/01/21 1327   Dressing Status Clean;Dry; Intact 02/01/21 2315   Wound Cleansed Cleansed with saline 02/01/21 1327   Dressing/Treatment Other (comment) 01/31/21 1601   Dressing Change Due 02/02/21 02/01/21 1327   Wound Length (cm) 4 cm 02/01/21 1327   Wound Width (cm) 2.5 cm 02/01/21 1327   Wound Surface Area (cm^2) 10 cm^2 02/01/21 1327   Change in Wound Size % (l*w) 36.51 02/01/21 1327   Wound Assessment Eschar dry 02/01/21 1327   Drainage Amount None 02/01/21 1327   Odor None 02/01/21 1327   Yaneli-wound Assessment Dry/flaky;Erosion 02/01/21 1327   Margins Unattached edges 01/31/21 1555   Number of days: 1       Wound 01/31/21 Hip Anterior;Proximal;Right (Active)   Wound Image   02/01/21 1327   Dressing Status Dry; Intact; Old drainage noted 02/01/21 2315   Wound Cleansed Cleansed with saline 02/01/21 1327   Dressing/Treatment Other (comment) 02/01/21 2315   Dressing Change Due 02/02/21 02/01/21 1327   Wound Length (cm) 4 cm 02/01/21 1327   Wound Width (cm) 3 cm 02/01/21 1327   Wound Depth (cm) 0.2 cm 02/01/21 1327   Wound Surface Area (cm^2) 12 cm^2 02/01/21 1327   Change in Wound Size % (l*w) 23.81 02/01/21 1327   Wound Volume (cm^3) 2.4 cm^3 02/01/21 1327   Wound Assessment Other (Comment) 02/01/21 2315   Drainage Amount Scant 02/01/21 1327   Drainage Description Serosanguinous 02/01/21 1327   Odor None 02/01/21 1327   Yaneli-wound Assessment Intact 02/01/21 1327   Number of days: 1       Wound 01/31/21 Elbow Anterior;Right amputated (Active)   Dressing Status Intact 02/01/21 2315   Wound Cleansed Cleansed with saline 01/31/21 1559   Wound Length (cm) 0.5 cm 01/31/21 1601   Wound Width (cm) 1 cm 01/31/21 1601   Wound Surface Area (cm^2) 0.5 cm^2 01/31/21 1601   Drainage Amount Scant 01/31/21 1559   Odor None 01/31/21 1601   Number of days: 1       Wound 02/01/21 Heel Right (Active)   Wound Image   02/01/21 1327   Wound Etiology Deep tissue/Injury 02/01/21 1327   Wound Length (cm) 1 cm 02/01/21 1327   Wound Width (cm) 3 cm 02/01/21 1327   Wound Surface Area (cm^2) 3 cm^2 02/01/21 1327   Wound Assessment Purple/maroon 02/01/21 1327   Drainage Amount None 02/01/21 1327   Odor None 02/01/21 1327   Number of days: 0        Elimination:  Continence:   · Bowel: No  · Bladder: No  Urinary Catheter: None   Colostomy/Ileostomy/Ileal Conduit: No       Date of Last BM:     Intake/Output Summary (Last 24 hours) at 2/2/2021 1027  Last data filed at 2/2/2021 0751  Gross per 24 hour   Intake 360 ml   Output 950 ml   Net -590 ml     I/O last 3 completed shifts: In: 240 [P.O.:240]  Out: 950 [Urine:950]    Safety Concerns:     History of Falls (last 30 days)    Impairments/Disabilities:      Amputation - rt arm    Nutrition Therapy:  Current Nutrition Therapy:   - Oral Diet:  General Carb Control    Routes of Feeding: Oral  Liquids: No Restrictions  Daily Fluid Restriction: no  Last Modified Barium Swallow with Video (Video Swallowing Test): not done    Treatments at the Time of Hospital Discharge:   Respiratory Treatments:   Oxygen Therapy:  is not on home oxygen therapy.   Ventilator:    - No ventilator support    Rehab Therapies: Physical Therapy and Occupational Therapy  Weight Bearing Status/Restrictions: No weight bearing restirctions  Other Medical Equipment (for information only, NOT a DME order)    Other Treatments: ***    Patient's personal belongings (please select all that are sent with patient):  None    RN SIGNATURE:  Electronically signed by Jaclyn العلي RN on 2/2/2021 at 4:35 PM      CASE MANAGEMENT/SOCIAL WORK SECTION    Inpatient Status Date: ***    Readmission Risk Assessment Score:  Readmission Risk              Risk of Unplanned Readmission:        12           Discharging to Facility/ Agency   · Name: [facility]  [facility], [address] [place] [address] Road [facility]         Phone: [phone]       Fax: [phone]          Dialysis Facility (if applicable)   · Name:  · Address:  · Dialysis Schedule:  · Phone:  · Fax:    / signature: Electronically signed by Long Russo on 2/2/21 at 10:28 AM EST    PHYSICIAN SECTION    Prognosis: {Prognosis:6983391506}    Condition at Discharge: Stable    Rehab Potential (if transferring to Rehab): {Prognosis:2221007076}    Recommended Labs or Other Treatments After Discharge: ***    Physician Certification: I certify the above information and transfer of Carmen Hernández  is necessary for the continuing treatment of the diagnosis listed and that she requires Intermediate Nursing Care for greater 30 days.      Update Admission H&P: {CHP DME Changes in PUBTY:300724187}    PHYSICIAN SIGNATURE:  Electronically signed by Aries Pierre MD on 2/2/2021 at 3:40 PM

## 2021-02-03 NOTE — PROGRESS NOTES
[facility] called at 2030 stating they would be here in an hour. Will call at 2200 if they have not arrived.

## 2021-02-05 LAB
BLOOD CULTURE, ROUTINE: NORMAL
CULTURE, BLOOD 2: NORMAL

## 2021-02-19 ENCOUNTER — TELEPHONE (OUTPATIENT)
Dept: RADIATION ONCOLOGY | Age: 86
End: 2021-02-19

## 2021-02-19 NOTE — TELEPHONE ENCOUNTER
Rad Oncology RN placed call to [facility] to request Dermatology pathology and physician's note prior to consult on Monday-Message left for Lisa Gavin with fax number provided. Patient has not followed with Dr Raoul Gambino office since 2018 following prior treatment related to Basal Cell Carcinoma.

## 2021-02-22 ENCOUNTER — TELEPHONE (OUTPATIENT)
Dept: RADIATION ONCOLOGY | Age: 86
End: 2021-02-22

## 2021-02-22 ENCOUNTER — HOSPITAL ENCOUNTER (OUTPATIENT)
Dept: RADIATION ONCOLOGY | Age: 86
Discharge: HOME | End: 2021-02-22
Payer: COMMERCIAL

## 2021-02-22 VITALS
SYSTOLIC BLOOD PRESSURE: 138 MMHG | RESPIRATION RATE: 18 BRPM | HEART RATE: 98 BPM | OXYGEN SATURATION: 92 % | DIASTOLIC BLOOD PRESSURE: 88 MMHG | TEMPERATURE: 97.3 F

## 2021-02-22 DIAGNOSIS — C44.90 SKIN CANCER: Primary | ICD-10-CM

## 2021-02-22 PROCEDURE — 99215 OFFICE O/P EST HI 40 MIN: CPT

## 2021-02-22 PROCEDURE — 99215 OFFICE O/P EST HI 40 MIN: CPT | Performed by: RADIOLOGY

## 2021-02-22 NOTE — PROGRESS NOTES
Daniela Stanley  1935 81 y.o. Referring Physician: Dr. Rupesh Burgess    PCP: Nida Mackenzie MD     Vitals:    21 1116   BP: 138/88   Pulse: 98   Resp: 18   Temp: 97.3 °F (36.3 °C)   SpO2: 92%        Wt Readings from Last 3 Encounters:   21 157 lb 11.2 oz (71.5 kg)   19 150 lb (68 kg)   18 150 lb 3.2 oz (68.1 kg)        There is no height or weight on file to calculate BMI. Chief Complaint: No chief complaint on file. Cancer Staging  No matching staging information was found for the patient. Prior Radiation Therapy? YES: Site Treated: left leg          Facility: here          Date: will check    Concurrent Chemo/radiation? Pt has dementia and does not understand questions presented, from a nursing home facility. Prior Chemotherapy? Pt has dementia and does not understand questions presented, from a nursing home facility. Prior Hormonal Therapy? Pt has dementia and does not understand questions presented, from a nursing home facility. Head and Neck Cancer? No, patient does NOT have HN cancer. LMP: unknown    Age at first Menses: 9    : 2    Para: 2        Current Outpatient Medications   Medication Sig Dispense Refill    insulin lispro (HUMALOG) 100 UNIT/ML injection vial Inject into the skin 3 times daily (before meals) Give if bs >200      ammonium lactate (LAC-HYDRIN) 12 % lotion Apply topically 2 times daily Apply topically to BLE as needed.       dextromethorphan-guaiFENesin (ROBITUSSIN COLD COUGH+ CHEST)  MG/5ML syrup Take 10 mLs by mouth every 4 hours as needed for Cough      apixaban (ELIQUIS) 2.5 MG TABS tablet Take by mouth 2 times daily      pantoprazole (PROTONIX) 40 MG tablet Take 1 tablet by mouth every morning (before breakfast) 30 tablet 3    hypromellose (ARTIFICIAL TEARS) 0.4 % SOLN ophthalmic solution Place 2 drops into both eyes every 4 hours as needed      Sodium Phosphates (FLEET) 7-19 GM/118ML Place 1 enema rectally See Admin Instructions Every fourth day if no bm      mirtazapine (REMERON) 15 MG tablet Take 15 mg by mouth nightly      Bisacodyl (DULCOLAX RE) Place rectally      magnesium hydroxide (MILK OF MAGNESIA) 400 MG/5ML suspension Take by mouth daily as needed for Constipation      memantine (NAMENDA XR) 28 MG CP24 capsule Take 28 mg by mouth daily      acetaminophen (TYLENOL) 325 MG tablet Take 650 mg by mouth every 4 hours as needed for Pain      pravastatin (PRAVACHOL) 40 MG tablet Take 40 mg by mouth daily      amLODIPine (NORVASC) 10 MG tablet Take 10 mg by mouth daily       No current facility-administered medications for this encounter. Past Medical History:   Diagnosis Date    Closed right hip fracture ([facility] 7/22/2015    Dementia ([facility]     DVT (deep venous thrombosis) ([facility] 11/24/2016    GERD (gastroesophageal reflux disease)     Hyperlipidemia     Hypertension     Left scapula fracture 7/22/2015    Metatarsal fracture 7/22/2015    Moderate protein malnutrition ([facility] 7/16/2015    Multiple fractures     legs, arms, ribs, sternum, facial    Radial styloid fracture 7/22/2015    Type II or unspecified type diabetes mellitus without mention of complication, not stated as uncontrolled        Past Surgical History:   Procedure Laterality Date    ABDOMEN SURGERY      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 Maxey Kanner Right 11/28/2016    REMOVAL OLD HARDWARE RIGHT HIP;TOTAL RIGHT HIP ARTHROPLASTY    VENA CAVA FILTER PLACEMENT  12/01/2016    Aby Lozoya    VENA CAVA FILTER PLACEMENT Right 12/01/2016       History reviewed. No pertinent family history.     Social History     Socioeconomic History    Marital status: Single     Spouse name: Not on file    Number of children: Not on file    Years of education: Not on file    Highest education level: Not on file   Occupational History    Not on file   Social Needs    Financial resource strain: Not on file    Food insecurity     Worry: Not on file     Inability: Not on file    Transportation needs     Medical: Not on file     Non-medical: Not on file   Tobacco Use    Smoking status: Never Smoker    Smokeless tobacco: Current User     Types: Chew    Tobacco comment: 60 years with chewing tobacco    Substance and Sexual Activity    Alcohol use: No     Alcohol/week: 0.0 standard drinks    Drug use: No    Sexual activity: Not on file   Lifestyle    Physical activity     Days per week: Not on file     Minutes per session: Not on file    Stress: Not on file   Relationships    Social connections     Talks on phone: Not on file     Gets together: Not on file     Attends Anglican service: Not on file     Active member of club or organization: Not on file     Attends meetings of clubs or organizations: Not on file     Relationship status: Not on file    Intimate partner violence     Fear of current or ex partner: Not on file     Emotionally abused: Not on file     Physically abused: Not on file     Forced sexual activity: Not on file   Other Topics Concern    Not on file   Social History Narrative    Not on file           Occupation: retired  Retired:  YES: Patient is retired from cleaning place. REVIEW OF SYSTEMS: <<For Level 5, 10 or more systems>> Approximately 20 mins spent with patient and health care worker about radiation therapy to her left dorsal foot. Pt has had radiation therapy to her left leg anterior from 7/19-8/18/2018 for 20fx, 5500cGY. She stated she understands radiation therapy but has a hx of dementia. I explained radiation treatment utilizing handout and slides to pt and caregiver. Many questions were unknown because the patient cannot remember and helper did not know.  She was referred by Dr. Kev Nelson from abiopsy done on 10/27/2020 from her left doral foot with patholgy being detached fragments of basal cell carcinoma. Caregiver was given all the information in a booklet . Pacemaker/Defibulator/ICD:  No    Mediport: No        FALLS RISK SCREENING ASSESSMENT    Instructions:  Assess the patient and enter the appropriate indicators that are present for fall risk identification. Total the numbers entered and assign a fall risk score from Table 2.  Reassess patient at a minimum every 12 weeks or with status change. Assessment   Date  2/22/2021     1. Mental Ability: confusion/cognitively impaired Yes - 3       2. Elimination Issues: incontinence, frequency Yes - 3       3. Ambulatory: use of assistive devices (walker, cane, off-loading devices), attached to equipment (IV pole, oxygen) Yes - 2     4. Sensory Limitations: dizziness, vertigo, impaired vision No - 0       5. Age 72 years or greater - 1       10. Medication: diuretics, strong analgesics, hypnotics, sedatives, antihypertensive agents   Yes - 3   7. Falls:  recent history of falls within the last 3 months (not to include slipping or tripping)   No - 0   TOTAL 12    If score of 4 or greater was education given? Yes       TABLE 2   Risk Score Risk Level Plan of Care   0-3 Little or  No Risk 1. Provide assistance as indicated for ambulation activities  2. Reorient confused/cognitively impaired patient  3. Call-light/bell within patient's reach  4. Chair/bed in low position, stretcher/bed with siderails up except when performing patient care activities  5. Educate patient/family/caregiver on falls prevention  6.  Reassess in 12 weeks or with any noted change in patient condition which places them at a risk for a fall   4-6 Moderate Risk 1. Provide assistance as indicated for ambulation activities  2. Reorient confused/cognitively impaired patient  3. Call-light/bell within patient's reach  4. Chair/bed in low position, stretcher/bed with siderails up except when performing patient care activities  5.   Educate patient/family/caregiver on falls prevention  6. Falls risk precaution (Yellow sticker Level II) placed on patient chart   7 or   Higher High Risk 1. Place patient in easily observable treatment room  2. Patient attended at all times by family member or staff  3. Provide assistance as indicated for ambulation activities  4. Reorient confused/cognitively impaired patient  5. Call-light/bell within patient's reach  6. Chair/bed in low position, stretcher/bed with siderails up except when performing patient care activities  7. Educate patient/family/caregiver on falls prevention  8. Falls risk precaution (Yellow sticker Level III) placed on patient chart           MALNUTRITION RISK SCREENING ASSESSMENT    Instructions:  Assess the patient and enter the appropriate indicators that are present for nutrition risk identification. Total the numbers entered and assign a risk score. Follow the appropriate action for total score listed below. Assessment   Date  2/22/2021     1. Have you lost weight without trying? 0- No     2. Have you been eating poorly because of a decreased appetite? 0- No   3. Do you have a diagnosis of head and neck cancer? 0- No                                                                                    TOTAL 0          Score of 0-1: No action  Score 2 or greater:  · For Non-Diabetic Patient: Recommend adding Ensure Complete 2 x daily and provide patient with Ensure wellness bag with coupons  · For Diabetic Patient: Recommend adding Glucerna Shake 2 x daily and provide patient with Glucerna Wellness bag with coupons  · Route to the dietitian via [address]    · Are you having  difficulty performing daily routine tasks  due to fatigue or weakness (ie: bathing/showering, dressing, housework, meal prep, work, child Eros Adan):  Yes     · Do you have any arm flexibility/ROM restrictions, swelling or pain that limit activity: Yes     · Any changes in memory, attention/focus that impact daily activities: Yes     · Do you avoid participation in leisure/social activity due weakness, fatigue or pain: Yes     ARE ANY OF THE ABOVE ARE ANSWERED YES: Yes - but NO OT referral request sent due to patient refusal.          PT ASSESSMENT FOR REFERRAL    · Have you had any recent falls in past 2 months: no    · Do you have difficulty  going up/down stairs: yes     · Are you having difficulty walking: Yes     · Do you often hold onto furniture/environmental supports or feel off balance when you are walking: Yes     · Do you need to take rest breaks when you are walking: No     · Any pain on scale of 1-10 that limits your mobility: No 0/10    ARE ANY OF THE ABOVE ARE ANSWERED YES: Yes - but NO PT referral request sent due to patient refusal.                     Ebony Holman    - Is patient planned to receive Cisplatin? No. This patient is not planned to start Cisplatin. - Is patient planned to receive radiation therapy that may be directed toward auditory canals or nerves? No. Patient is not planned to start radiation therapy to auditory canals or nerves. - Is patient complaining of new onset hearing loss? No. Patient is not complaining of new onset hearing loss. Patient education given on  Radiation therapy to her left foot. The patient does  expresses understanding and acceptance of instructions, paper work given to health care worker with patient. Orquidea Pierre 2/22/2021 11:19 AM           Kala Henning

## 2021-02-22 NOTE — PATIENT INSTRUCTIONS
[facility]. MD Jackson Hardinnasir Olivas  Oncology  Cell: [phone]    [facility]:  [phone]   FAX: [phone]  [place]:  [address] [place] [address] Avenue:    [phone]  [address] [place] [address] Road:  [phone]   FAX:  [phone]  Email: [email or web address]

## 2021-02-24 ENCOUNTER — HOSPITAL ENCOUNTER (OUTPATIENT)
Dept: RADIATION ONCOLOGY | Age: 86
Discharge: HOME | End: 2021-02-24
Attending: RADIOLOGY
Payer: COMMERCIAL

## 2021-02-24 PROCEDURE — 77334 RADIATION TREATMENT AID(S): CPT | Performed by: RADIOLOGY

## 2021-02-24 PROCEDURE — 77263 THER RADIOLOGY TX PLNG CPLX: CPT | Performed by: RADIOLOGY

## 2021-03-02 ENCOUNTER — HOSPITAL ENCOUNTER (OUTPATIENT)
Dept: RADIATION ONCOLOGY | Age: 86
Discharge: HOME | End: 2021-03-02
Attending: RADIOLOGY
Payer: COMMERCIAL

## 2021-03-02 PROCEDURE — 77334 RADIATION TREATMENT AID(S): CPT | Performed by: RADIOLOGY

## 2021-03-02 PROCEDURE — 77300 RADIATION THERAPY DOSE PLAN: CPT | Performed by: RADIOLOGY

## 2021-03-10 ENCOUNTER — HOSPITAL ENCOUNTER (OUTPATIENT)
Dept: RADIATION ONCOLOGY | Age: 86
Discharge: HOME | End: 2021-03-10
Attending: RADIOLOGY
Payer: COMMERCIAL

## 2021-03-10 ENCOUNTER — APPOINTMENT (OUTPATIENT)
Dept: RADIATION ONCOLOGY | Age: 86
End: 2021-03-10
Attending: RADIOLOGY
Payer: COMMERCIAL

## 2021-03-10 VITALS
HEART RATE: 82 BPM | OXYGEN SATURATION: 90 % | DIASTOLIC BLOOD PRESSURE: 78 MMHG | SYSTOLIC BLOOD PRESSURE: 130 MMHG | TEMPERATURE: 97.1 F | RESPIRATION RATE: 18 BRPM

## 2021-03-10 DIAGNOSIS — C44.90 SKIN CANCER: Primary | ICD-10-CM

## 2021-03-10 PROCEDURE — 77280 THER RAD SIMULAJ FIELD SMPL: CPT | Performed by: RADIOLOGY

## 2021-03-10 PROCEDURE — 77412 RADIATION TX DELIVERY LVL 3: CPT | Performed by: RADIOLOGY

## 2021-03-10 NOTE — PROGRESS NOTES
Nonah Rolling  3/10/2021  Wt Readings from Last 3 Encounters:   02/02/21 157 lb 11.2 oz (71.5 kg)   05/25/19 150 lb (68 kg)   06/26/18 150 lb 3.2 oz (68.1 kg)     There is no height or weight on file to calculate BMI. Treatment Area: CTV left foot    Patient was seen today for weekly visit. Comfort Alteration  KPS:40%  Fatigue: Mild        Nutritional Alteration  Anorexia: No   Nausea: No   Vomiting: No    Skin Alteration   Sensation:no complaints    Radiation Dermatitis:  na    Mucous Membrane Alteration  Drainage: No  Drainage Odor: No    Emotional  Coping: hard to assess      Injury, potential bleeding or infection: na      Other:na    Lab Results   Component Value Date    WBC 11.3 02/02/2021     02/02/2021         /78   Pulse 82   Temp 97.1 °F (36.2 °C) (Skin)   Resp 18   SpO2 90%   BP within normal range?  yes        Assessment/Plan:1/20fx   275/5500cGY completed    Vita Roque

## 2021-03-11 ENCOUNTER — APPOINTMENT (OUTPATIENT)
Dept: RADIATION ONCOLOGY | Age: 86
End: 2021-03-11
Attending: RADIOLOGY
Payer: COMMERCIAL

## 2021-03-11 ENCOUNTER — HOSPITAL ENCOUNTER (OUTPATIENT)
Dept: RADIATION ONCOLOGY | Age: 86
Discharge: HOME | End: 2021-03-11
Attending: RADIOLOGY
Payer: COMMERCIAL

## 2021-03-11 PROCEDURE — 77412 RADIATION TX DELIVERY LVL 3: CPT | Performed by: RADIOLOGY

## 2021-03-12 ENCOUNTER — HOSPITAL ENCOUNTER (OUTPATIENT)
Dept: RADIATION ONCOLOGY | Age: 86
Discharge: HOME | End: 2021-03-12
Attending: RADIOLOGY
Payer: COMMERCIAL

## 2021-03-12 ENCOUNTER — APPOINTMENT (OUTPATIENT)
Dept: RADIATION ONCOLOGY | Age: 86
End: 2021-03-12
Attending: RADIOLOGY
Payer: COMMERCIAL

## 2021-03-12 PROCEDURE — 77412 RADIATION TX DELIVERY LVL 3: CPT | Performed by: RADIOLOGY

## 2021-03-15 ENCOUNTER — HOSPITAL ENCOUNTER (OUTPATIENT)
Dept: RADIATION ONCOLOGY | Age: 86
Discharge: HOME | End: 2021-03-15
Attending: RADIOLOGY
Payer: COMMERCIAL

## 2021-03-15 ENCOUNTER — APPOINTMENT (OUTPATIENT)
Dept: RADIATION ONCOLOGY | Age: 86
End: 2021-03-15
Attending: RADIOLOGY
Payer: COMMERCIAL

## 2021-03-15 PROCEDURE — 77412 RADIATION TX DELIVERY LVL 3: CPT | Performed by: RADIOLOGY

## 2021-03-16 ENCOUNTER — HOSPITAL ENCOUNTER (OUTPATIENT)
Dept: RADIATION ONCOLOGY | Age: 86
Discharge: HOME | End: 2021-03-16
Attending: RADIOLOGY
Payer: COMMERCIAL

## 2021-03-16 ENCOUNTER — APPOINTMENT (OUTPATIENT)
Dept: RADIATION ONCOLOGY | Age: 86
End: 2021-03-16
Attending: RADIOLOGY
Payer: COMMERCIAL

## 2021-03-16 PROCEDURE — 77412 RADIATION TX DELIVERY LVL 3: CPT | Performed by: RADIOLOGY

## 2021-03-16 PROCEDURE — 77427 RADIATION TX MANAGEMENT X5: CPT | Performed by: RADIOLOGY

## 2021-03-16 PROCEDURE — 77336 RADIATION PHYSICS CONSULT: CPT | Performed by: RADIOLOGY

## 2021-03-17 ENCOUNTER — HOSPITAL ENCOUNTER (OUTPATIENT)
Dept: RADIATION ONCOLOGY | Age: 86
Discharge: HOME | End: 2021-03-17
Attending: RADIOLOGY
Payer: COMMERCIAL

## 2021-03-17 ENCOUNTER — APPOINTMENT (OUTPATIENT)
Dept: RADIATION ONCOLOGY | Age: 86
End: 2021-03-17
Attending: RADIOLOGY
Payer: COMMERCIAL

## 2021-03-17 VITALS
DIASTOLIC BLOOD PRESSURE: 58 MMHG | HEART RATE: 91 BPM | RESPIRATION RATE: 18 BRPM | SYSTOLIC BLOOD PRESSURE: 124 MMHG | OXYGEN SATURATION: 98 %

## 2021-03-17 DIAGNOSIS — C44.90 SKIN CANCER: Primary | ICD-10-CM

## 2021-03-17 PROCEDURE — 99999 PR OFFICE/OUTPT VISIT,PROCEDURE ONLY: CPT | Performed by: RADIOLOGY

## 2021-03-17 PROCEDURE — 77412 RADIATION TX DELIVERY LVL 3: CPT | Performed by: RADIOLOGY

## 2021-03-17 NOTE — PROGRESS NOTES
Phani Sharp  3/17/2021  Wt Readings from Last 3 Encounters:   02/02/21 157 lb 11.2 oz (71.5 kg)   05/25/19 150 lb (68 kg)   06/26/18 150 lb 3.2 oz (68.1 kg)     There is no height or weight on file to calculate BMI. Treatment Area:left foot    Patient was seen today for weekly visit.      Comfort Alteration  KPS:50%  Fatigue: None        Nutritional Alteration  Anorexia: No   Nausea: No   Vomiting: No    Skin Alteration   Sensation:na    Radiation Dermatitis:  na    Mucous Membrane Alteration  Drainage: No  Drainage Odor: No    Emotional  Coping: effective      Injury, potential bleeding or infection: na      Other:na    Lab Results   Component Value Date    WBC 11.3 02/02/2021     02/02/2021         BP (!) 124/58   Pulse 91   Resp 18   SpO2 98%         Assessment/Plan: Patient has completed 6/20 fx, 1650cGy/5500    Verlon Doctor

## 2021-03-18 ENCOUNTER — HOSPITAL ENCOUNTER (OUTPATIENT)
Dept: RADIATION ONCOLOGY | Age: 86
Discharge: HOME | End: 2021-03-18
Attending: RADIOLOGY
Payer: COMMERCIAL

## 2021-03-18 ENCOUNTER — APPOINTMENT (OUTPATIENT)
Dept: RADIATION ONCOLOGY | Age: 86
End: 2021-03-18
Attending: RADIOLOGY
Payer: COMMERCIAL

## 2021-03-18 PROCEDURE — 77412 RADIATION TX DELIVERY LVL 3: CPT | Performed by: RADIOLOGY

## 2021-03-19 ENCOUNTER — APPOINTMENT (OUTPATIENT)
Dept: RADIATION ONCOLOGY | Age: 86
End: 2021-03-19
Attending: RADIOLOGY
Payer: COMMERCIAL

## 2021-03-19 ENCOUNTER — HOSPITAL ENCOUNTER (OUTPATIENT)
Dept: RADIATION ONCOLOGY | Age: 86
Discharge: HOME | End: 2021-03-19
Attending: RADIOLOGY
Payer: COMMERCIAL

## 2021-03-19 PROCEDURE — 77412 RADIATION TX DELIVERY LVL 3: CPT | Performed by: RADIOLOGY

## 2021-03-19 NOTE — PROGRESS NOTES
DEPARTMENT OF RADIATION ONCOLOGY ON TREATMENT VISIT         3/19/2021      NAME:  Cheko Oh    YOB: 1935    Diagnosis:  Skin cancer    SUBJECTIVE:   Cheko Oh has now received fractionated external beam radiation therapy - ongoing. Past medical, surgical, social and family histories reviewed and updated as indicated. Pain: controlled    ALLERGIES:  Patient has no known allergies. Current Outpatient Medications   Medication Sig Dispense Refill    insulin lispro (HUMALOG) 100 UNIT/ML injection vial Inject into the skin 3 times daily (before meals) Give if bs >200      ammonium lactate (LAC-HYDRIN) 12 % lotion Apply topically 2 times daily Apply topically to BLE as needed.  dextromethorphan-guaiFENesin (ROBITUSSIN COLD COUGH+ CHEST)  MG/5ML syrup Take 10 mLs by mouth every 4 hours as needed for Cough      apixaban (ELIQUIS) 2.5 MG TABS tablet Take by mouth 2 times daily      pantoprazole (PROTONIX) 40 MG tablet Take 1 tablet by mouth every morning (before breakfast) 30 tablet 3    hypromellose (ARTIFICIAL TEARS) 0.4 % SOLN ophthalmic solution Place 2 drops into both eyes every 4 hours as needed      Sodium Phosphates (FLEET) 7-19 GM/118ML Place 1 enema rectally See Admin Instructions Every fourth day if no bm      mirtazapine (REMERON) 15 MG tablet Take 15 mg by mouth nightly      Bisacodyl (DULCOLAX RE) Place rectally      magnesium hydroxide (MILK OF MAGNESIA) 400 MG/5ML suspension Take by mouth daily as needed for Constipation      memantine (NAMENDA XR) 28 MG CP24 capsule Take 28 mg by mouth daily      acetaminophen (TYLENOL) 325 MG tablet Take 650 mg by mouth every 4 hours as needed for Pain      pravastatin (PRAVACHOL) 40 MG tablet Take 40 mg by mouth daily      amLODIPine (NORVASC) 10 MG tablet Take 10 mg by mouth daily       No current facility-administered medications for this encounter. OBJECTIVE:  Alert and fully ambulatory. Pleasant and conversant. Physical Examination: General appearance - alert, well appearing, and in no distress. Wt Readings from Last 3 Encounters:   02/02/21 157 lb 11.2 oz (71.5 kg)   05/25/19 150 lb (68 kg)   06/26/18 150 lb 3.2 oz (68.1 kg)         ASSESSMENT/PLAN:     Patient is tolerating treatments well with expected toxicities. RBA were reviewed prior to first fraction and PRN. Current and planned dose reviewed. Goals of treatment and potential side effects were reviewed with the patient PRN. Treatment imaging has been personally reviewed for accuracy and precision. Questions answered to apparent satisfaction. Treatments will continue as planned. Luis Fernando French.  Marcos Naranjo MD MS DABR  Radiation Oncologist        [facility]): [phone] /// FAX: [phone]  [facility]): [phone] /// FAX: [phone]  [facility]): [phone] /// FAX: [phone]

## 2021-03-19 NOTE — PROGRESS NOTES
DEPARTMENT OF RADIATION ONCOLOGY ON TREATMENT VISIT         3/19/2021      NAME:  Madi Roberson    YOB: 1935    Diagnosis: skin cancer    SUBJECTIVE:   Madi Roberson has now received fractionated external beam radiation therapy - ongoing. Past medical, surgical, social and family histories reviewed and updated as indicated. Pain: controlled    ALLERGIES:  Patient has no known allergies. Current Outpatient Medications   Medication Sig Dispense Refill    insulin lispro (HUMALOG) 100 UNIT/ML injection vial Inject into the skin 3 times daily (before meals) Give if bs >200      ammonium lactate (LAC-HYDRIN) 12 % lotion Apply topically 2 times daily Apply topically to BLE as needed.  dextromethorphan-guaiFENesin (ROBITUSSIN COLD COUGH+ CHEST)  MG/5ML syrup Take 10 mLs by mouth every 4 hours as needed for Cough      apixaban (ELIQUIS) 2.5 MG TABS tablet Take by mouth 2 times daily      pantoprazole (PROTONIX) 40 MG tablet Take 1 tablet by mouth every morning (before breakfast) 30 tablet 3    hypromellose (ARTIFICIAL TEARS) 0.4 % SOLN ophthalmic solution Place 2 drops into both eyes every 4 hours as needed      Sodium Phosphates (FLEET) 7-19 GM/118ML Place 1 enema rectally See Admin Instructions Every fourth day if no bm      mirtazapine (REMERON) 15 MG tablet Take 15 mg by mouth nightly      Bisacodyl (DULCOLAX RE) Place rectally      magnesium hydroxide (MILK OF MAGNESIA) 400 MG/5ML suspension Take by mouth daily as needed for Constipation      memantine (NAMENDA XR) 28 MG CP24 capsule Take 28 mg by mouth daily      acetaminophen (TYLENOL) 325 MG tablet Take 650 mg by mouth every 4 hours as needed for Pain      pravastatin (PRAVACHOL) 40 MG tablet Take 40 mg by mouth daily      amLODIPine (NORVASC) 10 MG tablet Take 10 mg by mouth daily       No current facility-administered medications for this encounter. OBJECTIVE:  Alert and fully ambulatory. Pleasant and conversant. Physical Examination: General appearance - alert, well appearing, and in no distress. Wt Readings from Last 3 Encounters:   02/02/21 157 lb 11.2 oz (71.5 kg)   05/25/19 150 lb (68 kg)   06/26/18 150 lb 3.2 oz (68.1 kg)         ASSESSMENT/PLAN:     Patient is tolerating treatments well with expected toxicities. RBA were reviewed prior to first fraction and PRN. Current and planned dose reviewed. Goals of treatment and potential side effects were reviewed with the patient PRN. Treatment imaging has been personally reviewed for accuracy and precision. Questions answered to apparent satisfaction. Treatments will continue as planned. Buddy Astorga.  Carmelo Tinoco MD MS DABR  Radiation Oncologist        [facility] ([address] [place] [address]): [phone] /// FAX: [phone]  [facility]): [phone] /// FAX: [phone]  [address] [place] [address] Ramiro Marr): [phone] /// FAX: [phone]

## 2021-03-19 NOTE — PATIENT INSTRUCTIONS
Continue daily fractionated radiation therapy as scheduled. Please see weekly OTV note and intial consultation letter in [facility] for clinical details. Katharina Ruiz. Isaura Gutiérrez MD MS [facility]:  [phone]   FAX: [phone]  [facility]:  [phone]   FAX:    [phone]  [address] [place] [address] Road:  [phone]   FAX:  [phone]  Email: [email or web address]

## 2021-03-22 ENCOUNTER — HOSPITAL ENCOUNTER (OUTPATIENT)
Dept: RADIATION ONCOLOGY | Age: 86
Discharge: HOME | End: 2021-03-22
Attending: RADIOLOGY
Payer: COMMERCIAL

## 2021-03-22 ENCOUNTER — APPOINTMENT (OUTPATIENT)
Dept: RADIATION ONCOLOGY | Age: 86
End: 2021-03-22
Attending: RADIOLOGY
Payer: COMMERCIAL

## 2021-03-22 PROCEDURE — 77412 RADIATION TX DELIVERY LVL 3: CPT | Performed by: RADIOLOGY

## 2021-03-23 ENCOUNTER — HOSPITAL ENCOUNTER (OUTPATIENT)
Dept: RADIATION ONCOLOGY | Age: 86
Discharge: HOME | End: 2021-03-23
Attending: RADIOLOGY
Payer: COMMERCIAL

## 2021-03-23 ENCOUNTER — APPOINTMENT (OUTPATIENT)
Dept: RADIATION ONCOLOGY | Age: 86
End: 2021-03-23
Attending: RADIOLOGY
Payer: COMMERCIAL

## 2021-03-23 PROCEDURE — 77412 RADIATION TX DELIVERY LVL 3: CPT | Performed by: RADIOLOGY

## 2021-03-23 PROCEDURE — 77427 RADIATION TX MANAGEMENT X5: CPT | Performed by: RADIOLOGY

## 2021-03-23 PROCEDURE — 77336 RADIATION PHYSICS CONSULT: CPT | Performed by: RADIOLOGY

## 2021-03-24 ENCOUNTER — HOSPITAL ENCOUNTER (OUTPATIENT)
Dept: RADIATION ONCOLOGY | Age: 86
Discharge: HOME | End: 2021-03-24
Attending: RADIOLOGY
Payer: COMMERCIAL

## 2021-03-24 ENCOUNTER — APPOINTMENT (OUTPATIENT)
Dept: RADIATION ONCOLOGY | Age: 86
End: 2021-03-24
Attending: RADIOLOGY
Payer: COMMERCIAL

## 2021-03-24 VITALS
OXYGEN SATURATION: 92 % | SYSTOLIC BLOOD PRESSURE: 106 MMHG | HEART RATE: 102 BPM | RESPIRATION RATE: 18 BRPM | DIASTOLIC BLOOD PRESSURE: 58 MMHG

## 2021-03-24 PROCEDURE — 77412 RADIATION TX DELIVERY LVL 3: CPT | Performed by: RADIOLOGY

## 2021-03-24 NOTE — PROGRESS NOTES
Cameliae Angles  3/24/2021  Wt Readings from Last 3 Encounters:   02/02/21 157 lb 11.2 oz (71.5 kg)   05/25/19 150 lb (68 kg)   06/26/18 150 lb 3.2 oz (68.1 kg)     There is no height or weight on file to calculate BMI. Treatment Area:left foot    Patient was seen today for weekly visit. Comfort Alteration  KPS:50%  Fatigue: Mild        Nutritional Alteration  Anorexia: No   Nausea: No   Vomiting: No    Skin Alteration   Sensation:pt denies pain    Radiation Dermatitis:  na    Mucous Membrane Alteration  Drainage: No  Drainage Odor: No    Emotional  Coping: somewhat effective      Injury, potential bleeding or infection: na      Other:na    Lab Results   Component Value Date    WBC 11.3 02/02/2021     02/02/2021         BP (!) 106/58   Pulse 102   Resp 18   SpO2 92%   BP within normal range? yes        Assessment/Plan: patient has completed 11/20 fx, 3025 cGy/5500.     Bib Dupree

## 2021-03-24 NOTE — PROGRESS NOTES
DEPARTMENT OF RADIATION ONCOLOGY   ON TREATMENT VISIT       3/24/2021      NAME:  Rogelio Mina OF BIRTH:  1935    DIAGNOSIS:  cT2 BCC left foot, medically unresectable. SUBJECTIVE:   Bibi Harding has now received 3025 cGy in 275-cGy daily fractions directed to the left foot for management of the above diagnosis. She feels well and has no specific complaint. Past medical, surgical, social and family histories reviewed and updated as indicated. PAIN: She reports no pain. ALLERGIES:  Patient has no known allergies. Current Outpatient Medications   Medication Sig Dispense Refill    insulin lispro (HUMALOG) 100 UNIT/ML injection vial Inject into the skin 3 times daily (before meals) Give if bs >200      ammonium lactate (LAC-HYDRIN) 12 % lotion Apply topically 2 times daily Apply topically to BLE as needed.       dextromethorphan-guaiFENesin (ROBITUSSIN COLD COUGH+ CHEST)  MG/5ML syrup Take 10 mLs by mouth every 4 hours as needed for Cough      apixaban (ELIQUIS) 2.5 MG TABS tablet Take by mouth 2 times daily      pantoprazole (PROTONIX) 40 MG tablet Take 1 tablet by mouth every morning (before breakfast) 30 tablet 3    hypromellose (ARTIFICIAL TEARS) 0.4 % SOLN ophthalmic solution Place 2 drops into both eyes every 4 hours as needed      Sodium Phosphates (FLEET) 7-19 GM/118ML Place 1 enema rectally See Admin Instructions Every fourth day if no bm      mirtazapine (REMERON) 15 MG tablet Take 15 mg by mouth nightly      Bisacodyl (DULCOLAX RE) Place rectally      magnesium hydroxide (MILK OF MAGNESIA) 400 MG/5ML suspension Take by mouth daily as needed for Constipation      memantine (NAMENDA XR) 28 MG CP24 capsule Take 28 mg by mouth daily      acetaminophen (TYLENOL) 325 MG tablet Take 650 mg by mouth every 4 hours as needed for Pain      pravastatin (PRAVACHOL) 40 MG tablet Take 40 mg by mouth daily      amLODIPine (NORVASC) 10 MG tablet Take 10 mg by mouth daily       No current facility-administered medications for this encounter. OBJECTIVE:  Alert, awake, sitting in wheelchair. Physical Examination:  Constitutional:  81 y.o. female who is alert, oriented, cooperative and in no apparent distress. She looks well. After taking her left shoe and bandage off, inspection shows erythema, no moist desquamation, no evidence of infection. Vitals:    03/24/21 1319   BP: (!) 106/58   Pulse: 102   Resp: 18   SpO2: 92%       Wt Readings from Last 3 Encounters:   02/02/21 157 lb 11.2 oz (71.5 kg)   05/25/19 150 lb (68 kg)   06/26/18 150 lb 3.2 oz (68.1 kg)       ASSESSMENT/PLAN:     Patient is doing well, tolerating radiation treatment. RT is to continue as planned. Thank you for the opportunity to participate in multidisciplinary management of this patient.       Claritza Schmidt MD PhD  Radiation Oncologist, [address] [place] [address]    Department of Radiation Oncology  [facility]) [facility]: [phone] ([facility]: [phone])  [address] [place] [address]: [phone] (OND: [phone])  [facility]:  [phone] ([facility]PE:  [phone])

## 2021-03-25 ENCOUNTER — APPOINTMENT (OUTPATIENT)
Dept: RADIATION ONCOLOGY | Age: 86
End: 2021-03-25
Attending: RADIOLOGY
Payer: COMMERCIAL

## 2021-03-25 ENCOUNTER — HOSPITAL ENCOUNTER (OUTPATIENT)
Dept: RADIATION ONCOLOGY | Age: 86
Discharge: HOME | End: 2021-03-25
Attending: RADIOLOGY
Payer: COMMERCIAL

## 2021-03-25 PROCEDURE — 77412 RADIATION TX DELIVERY LVL 3: CPT | Performed by: RADIOLOGY

## 2021-03-26 ENCOUNTER — HOSPITAL ENCOUNTER (OUTPATIENT)
Dept: RADIATION ONCOLOGY | Age: 86
Discharge: HOME | End: 2021-03-26
Attending: RADIOLOGY
Payer: COMMERCIAL

## 2021-03-26 ENCOUNTER — APPOINTMENT (OUTPATIENT)
Dept: RADIATION ONCOLOGY | Age: 86
End: 2021-03-26
Attending: RADIOLOGY
Payer: COMMERCIAL

## 2021-03-26 PROCEDURE — 77412 RADIATION TX DELIVERY LVL 3: CPT | Performed by: RADIOLOGY

## 2021-03-29 ENCOUNTER — HOSPITAL ENCOUNTER (OUTPATIENT)
Dept: RADIATION ONCOLOGY | Age: 86
Discharge: HOME | End: 2021-03-29
Attending: RADIOLOGY
Payer: COMMERCIAL

## 2021-03-29 ENCOUNTER — APPOINTMENT (OUTPATIENT)
Dept: RADIATION ONCOLOGY | Age: 86
End: 2021-03-29
Attending: RADIOLOGY
Payer: COMMERCIAL

## 2021-03-29 PROCEDURE — 77412 RADIATION TX DELIVERY LVL 3: CPT | Performed by: RADIOLOGY

## 2021-03-30 ENCOUNTER — HOSPITAL ENCOUNTER (OUTPATIENT)
Dept: RADIATION ONCOLOGY | Age: 86
Discharge: HOME | End: 2021-03-30
Attending: RADIOLOGY
Payer: COMMERCIAL

## 2021-03-30 ENCOUNTER — APPOINTMENT (OUTPATIENT)
Dept: RADIATION ONCOLOGY | Age: 86
End: 2021-03-30
Attending: RADIOLOGY
Payer: COMMERCIAL

## 2021-03-30 PROCEDURE — 77336 RADIATION PHYSICS CONSULT: CPT | Performed by: RADIOLOGY

## 2021-03-30 PROCEDURE — 77412 RADIATION TX DELIVERY LVL 3: CPT | Performed by: RADIOLOGY

## 2021-03-30 PROCEDURE — 77427 RADIATION TX MANAGEMENT X5: CPT | Performed by: RADIOLOGY

## 2021-03-31 ENCOUNTER — APPOINTMENT (OUTPATIENT)
Dept: RADIATION ONCOLOGY | Age: 86
End: 2021-03-31
Attending: RADIOLOGY
Payer: COMMERCIAL

## 2021-03-31 ENCOUNTER — HOSPITAL ENCOUNTER (OUTPATIENT)
Dept: RADIATION ONCOLOGY | Age: 86
Discharge: HOME | End: 2021-03-31
Attending: RADIOLOGY
Payer: COMMERCIAL

## 2021-03-31 VITALS
HEART RATE: 88 BPM | DIASTOLIC BLOOD PRESSURE: 78 MMHG | TEMPERATURE: 97.3 F | RESPIRATION RATE: 18 BRPM | OXYGEN SATURATION: 93 % | SYSTOLIC BLOOD PRESSURE: 148 MMHG

## 2021-03-31 DIAGNOSIS — C44.90 SKIN CANCER: Primary | ICD-10-CM

## 2021-03-31 PROCEDURE — 99999 PR OFFICE/OUTPT VISIT,PROCEDURE ONLY: CPT | Performed by: RADIOLOGY

## 2021-03-31 PROCEDURE — 77412 RADIATION TX DELIVERY LVL 3: CPT | Performed by: RADIOLOGY

## 2021-03-31 NOTE — PROGRESS NOTES
Keith Rivera  3/31/2021  Wt Readings from Last 3 Encounters:   02/02/21 157 lb 11.2 oz (71.5 kg)   05/25/19 150 lb (68 kg)   06/26/18 150 lb 3.2 oz (68.1 kg)     There is no height or weight on file to calculate BMI. Treatment Area: CTV left foot    Patient was seen today for weekly visit. Comfort Alteration  KPS:50%  Fatigue: Mild        Nutritional Alteration  Anorexia: No   Nausea: No   Vomiting: No    Skin Alteration   Sensation:bandage on     Radiation Dermatitis:  na    Mucous Membrane Alteration  Drainage: No  Drainage Odor: No    Emotional  Coping: effective      Injury, potential bleeding or infection: na      Other:na    Lab Results   Component Value Date    WBC 11.3 02/02/2021     02/02/2021         BP (!) 148/78   Pulse 88   Temp 97.3 °F (36.3 °C) (Skin)   Resp 18   SpO2 93%   BP within normal range?  yes           Assessment/Plan:16/20fx   4400/5500cGY completed    Maureen Mina

## 2021-04-01 ENCOUNTER — APPOINTMENT (OUTPATIENT)
Dept: RADIATION ONCOLOGY | Age: 86
End: 2021-04-01
Attending: RADIOLOGY
Payer: COMMERCIAL

## 2021-04-01 ENCOUNTER — HOSPITAL ENCOUNTER (OUTPATIENT)
Dept: RADIATION ONCOLOGY | Age: 86
Discharge: HOME | End: 2021-04-01
Attending: RADIOLOGY
Payer: COMMERCIAL

## 2021-04-01 PROCEDURE — 77412 RADIATION TX DELIVERY LVL 3: CPT | Performed by: RADIOLOGY

## 2021-04-02 ENCOUNTER — HOSPITAL ENCOUNTER (OUTPATIENT)
Dept: RADIATION ONCOLOGY | Age: 86
Discharge: HOME | End: 2021-04-02
Attending: RADIOLOGY
Payer: COMMERCIAL

## 2021-04-02 ENCOUNTER — APPOINTMENT (OUTPATIENT)
Dept: RADIATION ONCOLOGY | Age: 86
End: 2021-04-02
Attending: RADIOLOGY
Payer: COMMERCIAL

## 2021-04-02 PROCEDURE — 77412 RADIATION TX DELIVERY LVL 3: CPT | Performed by: RADIOLOGY

## 2021-04-05 ENCOUNTER — APPOINTMENT (OUTPATIENT)
Dept: RADIATION ONCOLOGY | Age: 86
End: 2021-04-05
Attending: RADIOLOGY
Payer: COMMERCIAL

## 2021-04-05 ENCOUNTER — HOSPITAL ENCOUNTER (OUTPATIENT)
Dept: RADIATION ONCOLOGY | Age: 86
Discharge: HOME | End: 2021-04-05
Attending: RADIOLOGY
Payer: COMMERCIAL

## 2021-04-05 PROCEDURE — 77412 RADIATION TX DELIVERY LVL 3: CPT | Performed by: RADIOLOGY

## 2021-04-06 ENCOUNTER — HOSPITAL ENCOUNTER (OUTPATIENT)
Dept: RADIATION ONCOLOGY | Age: 86
Discharge: HOME | End: 2021-04-06
Attending: RADIOLOGY
Payer: COMMERCIAL

## 2021-04-06 ENCOUNTER — APPOINTMENT (OUTPATIENT)
Dept: RADIATION ONCOLOGY | Age: 86
End: 2021-04-06
Attending: RADIOLOGY
Payer: COMMERCIAL

## 2021-04-06 PROCEDURE — 77427 RADIATION TX MANAGEMENT X5: CPT | Performed by: RADIOLOGY

## 2021-04-06 PROCEDURE — 77412 RADIATION TX DELIVERY LVL 3: CPT | Performed by: RADIOLOGY

## 2021-04-06 PROCEDURE — 77336 RADIATION PHYSICS CONSULT: CPT | Performed by: RADIOLOGY

## 2021-04-06 NOTE — PROGRESS NOTES
Kristy Garlandois  4/6/2021  7:44 AM          Current Outpatient Medications   Medication Sig Dispense Refill    insulin lispro (HUMALOG) 100 UNIT/ML injection vial Inject into the skin 3 times daily (before meals) Give if bs >200      ammonium lactate (LAC-HYDRIN) 12 % lotion Apply topically 2 times daily Apply topically to BLE as needed.  dextromethorphan-guaiFENesin (ROBITUSSIN COLD COUGH+ CHEST)  MG/5ML syrup Take 10 mLs by mouth every 4 hours as needed for Cough      apixaban (ELIQUIS) 2.5 MG TABS tablet Take by mouth 2 times daily      pantoprazole (PROTONIX) 40 MG tablet Take 1 tablet by mouth every morning (before breakfast) 30 tablet 3    hypromellose (ARTIFICIAL TEARS) 0.4 % SOLN ophthalmic solution Place 2 drops into both eyes every 4 hours as needed      Sodium Phosphates (FLEET) 7-19 GM/118ML Place 1 enema rectally See Admin Instructions Every fourth day if no bm      mirtazapine (REMERON) 15 MG tablet Take 15 mg by mouth nightly      Bisacodyl (DULCOLAX RE) Place rectally      magnesium hydroxide (MILK OF MAGNESIA) 400 MG/5ML suspension Take by mouth daily as needed for Constipation      memantine (NAMENDA XR) 28 MG CP24 capsule Take 28 mg by mouth daily      acetaminophen (TYLENOL) 325 MG tablet Take 650 mg by mouth every 4 hours as needed for Pain      pravastatin (PRAVACHOL) 40 MG tablet Take 40 mg by mouth daily      amLODIPine (NORVASC) 10 MG tablet Take 10 mg by mouth daily       No current facility-administered medications for this encounter. This is an up-to-date medication list.    Please take this list to your next care provider, and discard any previous medication lists.

## 2021-04-09 NOTE — PROGRESS NOTES
DEPARTMENT OF RADIATION ONCOLOGY ON TREATMENT VISIT         4/9/2021      NAME:  Boy Fortune Check OF BIRTH:  1935    Diagnosis: skin CA    SUBJECTIVE:   Delma Spicer has now received fractionated external beam radiation therapy - ongoing. Past medical, surgical, social and family histories reviewed and updated as indicated. Pain: controlled    ALLERGIES:  Patient has no known allergies. Current Outpatient Medications   Medication Sig Dispense Refill    insulin lispro (HUMALOG) 100 UNIT/ML injection vial Inject into the skin 3 times daily (before meals) Give if bs >200      ammonium lactate (LAC-HYDRIN) 12 % lotion Apply topically 2 times daily Apply topically to BLE as needed.  dextromethorphan-guaiFENesin (ROBITUSSIN COLD COUGH+ CHEST)  MG/5ML syrup Take 10 mLs by mouth every 4 hours as needed for Cough      apixaban (ELIQUIS) 2.5 MG TABS tablet Take by mouth 2 times daily      pantoprazole (PROTONIX) 40 MG tablet Take 1 tablet by mouth every morning (before breakfast) 30 tablet 3    hypromellose (ARTIFICIAL TEARS) 0.4 % SOLN ophthalmic solution Place 2 drops into both eyes every 4 hours as needed      Sodium Phosphates (FLEET) 7-19 GM/118ML Place 1 enema rectally See Admin Instructions Every fourth day if no bm      mirtazapine (REMERON) 15 MG tablet Take 15 mg by mouth nightly      Bisacodyl (DULCOLAX RE) Place rectally      magnesium hydroxide (MILK OF MAGNESIA) 400 MG/5ML suspension Take by mouth daily as needed for Constipation      memantine (NAMENDA XR) 28 MG CP24 capsule Take 28 mg by mouth daily      acetaminophen (TYLENOL) 325 MG tablet Take 650 mg by mouth every 4 hours as needed for Pain      pravastatin (PRAVACHOL) 40 MG tablet Take 40 mg by mouth daily      amLODIPine (NORVASC) 10 MG tablet Take 10 mg by mouth daily       No current facility-administered medications for this encounter. OBJECTIVE:  Alert and fully ambulatory.  Pleasant and conversant. Physical Examination: General appearance - alert, well appearing, and in no distress. -[facility] at [facility] Readings from Last 3 Encounters:   02/02/21 157 lb 11.2 oz (71.5 kg)   05/25/19 150 lb (68 kg)   06/26/18 150 lb 3.2 oz (68.1 kg)         ASSESSMENT/PLAN:     Patient is tolerating treatments well with expected toxicities. RBA were reviewed prior to first fraction and PRN. Current and planned dose reviewed. Goals of treatment and potential side effects were reviewed with the patient PRN. Treatment imaging has been personally reviewed for accuracy and precision. Questions answered to apparent satisfaction. Treatments will continue as planned. Tam Castaneda.  Freddie Clement MD MS SANDER  Radiation Oncologist        [facility] ([address] [place] [address]): [phone] /// FAX: [phone]  [facility]): [phone] /// FAX: [phone]  [address] [place], [place] [address]brittany Jose): [phone] /// FAX: [phone]

## 2021-05-21 ENCOUNTER — HOSPITAL ENCOUNTER (OUTPATIENT)
Dept: RADIATION ONCOLOGY | Age: 86
Discharge: HOME | End: 2021-05-21
Attending: RADIOLOGY
Payer: COMMERCIAL

## 2021-05-21 VITALS
RESPIRATION RATE: 18 BRPM | TEMPERATURE: 97.8 F | OXYGEN SATURATION: 96 % | SYSTOLIC BLOOD PRESSURE: 122 MMHG | DIASTOLIC BLOOD PRESSURE: 64 MMHG | HEART RATE: 68 BPM

## 2021-05-21 DIAGNOSIS — C44.90 SKIN CANCER: Primary | ICD-10-CM

## 2021-05-21 PROCEDURE — 99999 PR OFFICE/OUTPT VISIT,PROCEDURE ONLY: CPT | Performed by: NURSE PRACTITIONER

## 2021-05-21 NOTE — PROGRESS NOTES
 memantine (NAMENDA XR) 28 MG CP24 capsule Take 28 mg by mouth daily      acetaminophen (TYLENOL) 325 MG tablet Take 650 mg by mouth every 4 hours as needed for Pain      pravastatin (PRAVACHOL) 40 MG tablet Take 40 mg by mouth daily      amLODIPine (NORVASC) 10 MG tablet Take 10 mg by mouth daily       No current facility-administered medications for this encounter. Physical Examination:   Vitals:    05/21/21 1022   BP: 122/64   Pulse: 68   Resp: 18   Temp: 97.8 °F (36.6 °C)   SpO2: 96%       Wt Readings from Last 3 Encounters:   02/02/21 157 lb 11.2 oz (71.5 kg)   05/25/19 150 lb (68 kg)   06/26/18 150 lb 3.2 oz (68.1 kg)       Alert sitting upright in wheelchair. Pleasant and conversant. Irradiated skin left foot lesion no erythema. No bleeding or drainage. ASSESSMENT/PLAN:     XRT to [facility] left foot completed 04/06/2021. Patient is doing well. No skin irritation complaints. No pain complaints. I discussed follow up plans with Princess Stanley and her nursing assistant. Continue to follow with Dermatology and Primary care. See Radiation Oncology on as needed basis       The patient was given our contact number in the event that if at any time they change their mind and would like to return to the clinic to see either myself or one of the Radiation Oncologists, they can simply call us and we would be happy to see them sooner. Thank you for involving us in the management of this extremely pleasant patient. More than 15 min was in direct contact with pt coordinating/giving care. >50% of the visit was spent in counseling the pt on the following: Follow up care    The nurses notes were reviewed and incorporated into this assessment and plan. Questions answered to apparent satisfaction.       Darlene Pena, MSN, APRN, CNP  Certified Nurse Practitioner for [address] [place] [address] Clock: [phone]/ F: [phone]   Kg Clock: [phone] / F: [phone]

## 2021-05-21 NOTE — PROGRESS NOTES
Kaia Hutchins  5/21/2021  10:25 AM      Vitals:    05/21/21 1022   BP: 122/64   Pulse: 68   Resp: 18   Temp: 97.8 °F (36.6 °C)   SpO2: 96%    : Wt Readings from Last 3 Encounters:   02/02/21 157 lb 11.2 oz (71.5 kg)   05/25/19 150 lb (68 kg)   06/26/18 150 lb 3.2 oz (68.1 kg)                Current Outpatient Medications:     insulin lispro (HUMALOG) 100 UNIT/ML injection vial, Inject into the skin 3 times daily (before meals) Give if bs >200, Disp: , Rfl:     ammonium lactate (LAC-HYDRIN) 12 % lotion, Apply topically 2 times daily Apply topically to BLE as needed. , Disp: , Rfl:     dextromethorphan-guaiFENesin (ROBITUSSIN COLD COUGH+ CHEST)  MG/5ML syrup, Take 10 mLs by mouth every 4 hours as needed for Cough, Disp: , Rfl:     apixaban (ELIQUIS) 2.5 MG TABS tablet, Take by mouth 2 times daily, Disp: , Rfl:     pantoprazole (PROTONIX) 40 MG tablet, Take 1 tablet by mouth every morning (before breakfast), Disp: 30 tablet, Rfl: 3    hypromellose (ARTIFICIAL TEARS) 0.4 % SOLN ophthalmic solution, Place 2 drops into both eyes every 4 hours as needed, Disp: , Rfl:     Sodium Phosphates (FLEET) 7-19 GM/118ML, Place 1 enema rectally See Admin Instructions Every fourth day if no bm, Disp: , Rfl:     mirtazapine (REMERON) 15 MG tablet, Take 15 mg by mouth nightly, Disp: , Rfl:     Bisacodyl (DULCOLAX RE), Place rectally, Disp: , Rfl:     magnesium hydroxide (MILK OF MAGNESIA) 400 MG/5ML suspension, Take by mouth daily as needed for Constipation, Disp: , Rfl:     memantine (NAMENDA XR) 28 MG CP24 capsule, Take 28 mg by mouth daily, Disp: , Rfl:     acetaminophen (TYLENOL) 325 MG tablet, Take 650 mg by mouth every 4 hours as needed for Pain, Disp: , Rfl:     pravastatin (PRAVACHOL) 40 MG tablet, Take 40 mg by mouth daily, Disp: , Rfl:     amLODIPine (NORVASC) 10 MG tablet, Take 10 mg by mouth daily, Disp: , Rfl:       Patient is seen today in follow up for  Completion of RT CTV L foot lesion on 4/6/21 and also RT to left leg anterior on 8/15/18. Pt has an agency [employer] with her today. Pt lives at the [facility]. Pt states she has no questions or concerns. FALLS RISK SCREENING ASSESSMENT    Instructions:  Assess the patient and enter the appropriate indicators that are present for fall risk identification. Total the numbers entered and assign a fall risk score from Table 2.  Reassess patient at a minimum every 12 weeks or with status change. Assessment   Date  5/21/2021     1. Mental Ability: confusion/cognitively impaired Yes - 3       2. Elimination Issues: incontinence, frequency Yes - 3       3. Ambulatory: use of assistive devices (walker, cane, off-loading devices), attached to equipment (IV pole, oxygen) Yes - 2     4. Sensory Limitations: dizziness, vertigo, impaired vision No - 0       5. Age 72 years or greater - 1       10. Medication: diuretics, strong analgesics, hypnotics, sedatives, antihypertensive agents   Yes - 3   7. Falls:  recent history of falls within the last 3 months (not to include slipping or tripping)   No - 0   TOTAL 12    If score of 4 or greater was education given? No       TABLE 2   Risk Score Risk Level Plan of Care   0-3 Little or  No Risk 1. Provide assistance as indicated for ambulation activities  2. Reorient confused/cognitively impaired patient  3. Call-light/bell within patient's reach  4. Chair/bed in low position, stretcher/bed with siderails up except when performing patient care activities  5. Educate patient/family/caregiver on falls prevention  6.  Reassess in 12 weeks or with any noted change in patient condition which places them at a risk for a fall   4-6 Moderate Risk 1. Provide assistance as indicated for ambulation activities  2. Reorient confused/cognitively impaired patient  3. Call-light/bell within patient's reach  4. Chair/bed in low position, stretcher/bed with siderails up except when performing patient care activities  5. Educate patient/family/caregiver on falls prevention  6. Falls risk precaution (Yellow sticker Level II) placed on patient chart   7 or   Higher High Risk 1. Place patient in easily observable treatment room  2. Patient attended at all times by family member or staff  3. Provide assistance as indicated for ambulation activities  4. Reorient confused/cognitively impaired patient  5. Call-light/bell within patient's reach  6. Chair/bed in low position, stretcher/bed with siderails up except when performing patient care activities  7. Educate patient/family/caregiver on falls prevention  8. Falls risk precaution (Yellow sticker Level III) placed on patient chart           MALNUTRITION RISK SCREENING ASSESSMENT    5/21/2021   Patient:  Caridad Espino  Sex:  female    Instructions:  Assess the patient and enter the appropriate indicators that are present for nutrition risk identification. Total the numbers entered and assign a risk score. Follow the appropriate action for total score listed below. Assessment   Date  5/21/2021     1. Have you lost weight without trying? 0- No     2. Have you been eating poorly because of a decreased appetite? 0- No   3. Do you have a diagnosis of head and neck cancer?       0- No                                                                                    TOTAL 0          Score of 0-1: No action  Score 2 or greater:  · For Non-Diabetic Patient: Recommend adding Ensure Complete 2 x daily and provide patient with Ensure wellness bag with coupons  · For Diabetic Patient: Recommend adding Glucerna Shake 2 x daily and provide patient with Glucerna Wellness bag with coupons  · Route to the dietitian via Yen Shepard RN

## 2022-01-30 ENCOUNTER — HOSPITAL ENCOUNTER (EMERGENCY)
Age: 87
Discharge: HOME | End: 2022-01-30
Attending: STUDENT IN AN ORGANIZED HEALTH CARE EDUCATION/TRAINING PROGRAM
Payer: COMMERCIAL

## 2022-01-30 VITALS
BODY MASS INDEX: 27.46 KG/M2 | WEIGHT: 160 LBS | SYSTOLIC BLOOD PRESSURE: 114 MMHG | OXYGEN SATURATION: 93 % | HEART RATE: 97 BPM | RESPIRATION RATE: 16 BRPM | TEMPERATURE: 98.5 F | DIASTOLIC BLOOD PRESSURE: 73 MMHG

## 2022-01-30 DIAGNOSIS — R11.2 NON-INTRACTABLE VOMITING WITH NAUSEA, UNSPECIFIED VOMITING TYPE: Primary | ICD-10-CM

## 2022-01-30 LAB
ABO/RH: NORMAL
ALBUMIN SERPL-MCNC: 4 G/DL (ref 3.5–5.2)
ALP BLD-CCNC: 130 U/L (ref 35–104)
ALT SERPL-CCNC: 19 U/L (ref 0–32)
ANION GAP SERPL CALCULATED.3IONS-SCNC: 14 MMOL/L (ref 7–16)
ANTIBODY SCREEN: NORMAL
AST SERPL-CCNC: 27 U/L (ref 0–31)
BASOPHILS ABSOLUTE: 0.02 E9/L (ref 0–0.2)
BASOPHILS RELATIVE PERCENT: 0.2 % (ref 0–2)
BILIRUB SERPL-MCNC: 0.5 MG/DL (ref 0–1.2)
BUN BLDV-MCNC: 16 MG/DL (ref 6–23)
BURR CELLS: ABNORMAL
CALCIUM SERPL-MCNC: 9.1 MG/DL (ref 8.6–10.2)
CHLORIDE BLD-SCNC: 101 MMOL/L (ref 98–107)
CO2: 23 MMOL/L (ref 22–29)
CREAT SERPL-MCNC: 0.7 MG/DL (ref 0.5–1)
EOSINOPHILS ABSOLUTE: 0.02 E9/L (ref 0.05–0.5)
EOSINOPHILS RELATIVE PERCENT: 0.2 % (ref 0–6)
GFR AFRICAN AMERICAN: >60
GFR NON-AFRICAN AMERICAN: >60 ML/MIN/1.73
GLUCOSE BLD-MCNC: 249 MG/DL (ref 74–99)
HCT VFR BLD CALC: 39 % (ref 34–48)
HEMOGLOBIN: 12.7 G/DL (ref 11.5–15.5)
IMMATURE GRANULOCYTES #: 0.02 E9/L
IMMATURE GRANULOCYTES %: 0.2 % (ref 0–5)
LIPASE: 22 U/L (ref 13–60)
LYMPHOCYTES ABSOLUTE: 0.58 E9/L (ref 1.5–4)
LYMPHOCYTES RELATIVE PERCENT: 5.9 % (ref 20–42)
MAGNESIUM: 2.2 MG/DL (ref 1.6–2.6)
MCH RBC QN AUTO: 28 PG (ref 26–35)
MCHC RBC AUTO-ENTMCNC: 32.6 % (ref 32–34.5)
MCV RBC AUTO: 85.9 FL (ref 80–99.9)
MONOCYTES ABSOLUTE: 0.63 E9/L (ref 0.1–0.95)
MONOCYTES RELATIVE PERCENT: 6.4 % (ref 2–12)
NEUTROPHILS ABSOLUTE: 8.63 E9/L (ref 1.8–7.3)
NEUTROPHILS RELATIVE PERCENT: 87.1 % (ref 43–80)
OVALOCYTES: ABNORMAL
PDW BLD-RTO: 14.9 FL (ref 11.5–15)
PLATELET # BLD: 258 E9/L (ref 130–450)
PMV BLD AUTO: 10.9 FL (ref 7–12)
POIKILOCYTES: ABNORMAL
POTASSIUM SERPL-SCNC: 3.6 MMOL/L (ref 3.5–5)
RBC # BLD: 4.54 E12/L (ref 3.5–5.5)
REASON FOR REJECTION: NORMAL
REJECTED TEST: NORMAL
SARS-COV-2, NAAT: NOT DETECTED
SODIUM BLD-SCNC: 138 MMOL/L (ref 132–146)
TOTAL PROTEIN: 7.6 G/DL (ref 6.4–8.3)
WBC # BLD: 9.9 E9/L (ref 4.5–11.5)

## 2022-01-30 PROCEDURE — 86901 BLOOD TYPING SEROLOGIC RH(D): CPT

## 2022-01-30 PROCEDURE — 83690 ASSAY OF LIPASE: CPT

## 2022-01-30 PROCEDURE — 2500000003 HC RX 250 WO HCPCS: Performed by: STUDENT IN AN ORGANIZED HEALTH CARE EDUCATION/TRAINING PROGRAM

## 2022-01-30 PROCEDURE — 83735 ASSAY OF MAGNESIUM: CPT

## 2022-01-30 PROCEDURE — 96375 TX/PRO/DX INJ NEW DRUG ADDON: CPT

## 2022-01-30 PROCEDURE — 6360000002 HC RX W HCPCS: Performed by: STUDENT IN AN ORGANIZED HEALTH CARE EDUCATION/TRAINING PROGRAM

## 2022-01-30 PROCEDURE — 96374 THER/PROPH/DIAG INJ IV PUSH: CPT

## 2022-01-30 PROCEDURE — 86850 RBC ANTIBODY SCREEN: CPT

## 2022-01-30 PROCEDURE — 87635 SARS-COV-2 COVID-19 AMP PRB: CPT

## 2022-01-30 PROCEDURE — 80053 COMPREHEN METABOLIC PANEL: CPT

## 2022-01-30 PROCEDURE — 85025 COMPLETE CBC W/AUTO DIFF WBC: CPT

## 2022-01-30 PROCEDURE — 86900 BLOOD TYPING SEROLOGIC ABO: CPT

## 2022-01-30 PROCEDURE — 99285 EMERGENCY DEPT VISIT HI MDM: CPT

## 2022-01-30 RX ORDER — ONDANSETRON 2 MG/ML
4 INJECTION INTRAMUSCULAR; INTRAVENOUS ONCE
Status: COMPLETED | OUTPATIENT
Start: 2022-01-30 | End: 2022-01-30

## 2022-01-30 RX ORDER — FAMOTIDINE 20 MG/1
20 TABLET, FILM COATED ORAL 2 TIMES DAILY
Qty: 60 TABLET | Refills: 0 | Status: SHIPPED | OUTPATIENT
Start: 2022-01-30 | End: 2022-02-02 | Stop reason: ALTCHOICE

## 2022-01-30 RX ADMIN — FAMOTIDINE 20 MG: 10 INJECTION, SOLUTION INTRAVENOUS at 15:24

## 2022-01-30 RX ADMIN — ONDANSETRON 4 MG: 2 INJECTION INTRAMUSCULAR; INTRAVENOUS at 15:25

## 2022-01-30 NOTE — ED PROVIDER NOTES
Department of Emergency Medicine   ED  Provider Note  Admit Date/RoomTime: 1/30/2022  2:41 PM  ED Room: 02/02      History of Present Illness:  1/30/22, Time: 3:02 PM EST  Chief Complaint   Patient presents with    Emesis     Idris Giron is a 81 y.o. female presenting to the ED for Coffee-ground emesis per nursing facility. Apparently, the patient had several episodes of coffee-ground emesis. Patient does not concern firmness that she has Alzheimer's. At present, she states that she was nauseous and had a couple episodes of emesis but was not concern for bleeding. She denies any change in her bowel habits. Nothing makes her symptoms better or worse they are constant duration. She is really unable to provide any additional useful history at this time. Review of Systems:  Review of systems obtained and negative unless stated otherwise above in the HPI.    --------------------------------------------- PAST HISTORY ---------------------------------------------  Past Medical History:  has a past medical history of Closed right hip fracture ([facility] [address] Dementia ([facility] [address] DVT (deep venous thrombosis) ([facility] [address] GERD (gastroesophageal reflux disease), Hyperlipidemia, Hypertension, Left scapula fracture, Metatarsal fracture, Moderate protein malnutrition ([facility] [address] Multiple fractures, Radial styloid fracture, and Type II or unspecified type diabetes mellitus without mention of complication, not stated as uncontrolled. 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 Social History:  reports that she has never smoked. Her smokeless tobacco use includes chew. She reports that she does not drink alcohol and does not use drugs. Family History: family history is not on file. . Unless otherwise noted, family history is non contributory    The patients home medications have been reviewed. Allergies: Patient has no known allergies. I have reviewed the past medical history, past surgical history, social history, and family history    ---------------------------------------------------PHYSICAL EXAM--------------------------------------    Constitutional: Appears in no distress  Head: Normocephalic, atraumatic  Eyes: Non-icteric slcera, no conjunctival injection  ENT: Moist mucous membranes,  Neck: Trachea midline, no JVD  Respiratory: Nonlabored respirations. Lungs clear to auscultation bilaterally, no wheezes, rales, or rhonchi. Cardiovascular: Regular rate. Regular rhythm. No murmurs, no gallops, no rubs. Gastrointestinal: Abdomen Soft, Non tender, Non distended. No rebound tenderness, guarding, or rigidity. Extremities: No lower extremity edema  Genitourinary: No CVA tenderness, no suprapubic tenderness  Musculoskeletal: Moves all extremities, no deformity  Skin: Pink, warm, dry without rash. Neurologic: Alert, symmetric facies, no aphasia    -------------------------------------------------- RESULTS -------------------------------------------------  I have personally reviewed all laboratory and imaging results for this patient. Results are listed below.      LABS: (Lab results interpreted by me)  Results for orders placed or performed during the hospital encounter of 01/30/22   COVID-19, Rapid    Specimen: Nasopharyngeal Swab   Result Value Ref Range    SARS-CoV-2, NAAT Not Detected Not Detected   CBC Auto Differential   Result Value Ref Range    WBC 9.9 4.5 - 11.5 E9/L    RBC 4.54 3.50 - 5.50 E12/L    Hemoglobin 12.7 11.5 - 15.5 g/dL    Hematocrit 39.0 34.0 - 48.0 %    MCV 85.9 80.0 - 99.9 fL    MCH 28.0 26.0 - 35.0 pg    MCHC 32.6 32.0 - 34.5 %    RDW 14.9 11.5 - 15.0 fL    Platelets 078 851 - 313 E9/L    MPV 10.9 7.0 - 12.0 fL    Neutrophils % 87.1 (H) 43.0 - 80.0 %    Immature Granulocytes % 0.2 0.0 - 5.0 %    Lymphocytes % 5.9 (L) 20.0 - 42.0 %    Monocytes % 6.4 2.0 - 12.0 %    Eosinophils % 0.2 0.0 - 6.0 %    Basophils % 0.2 0.0 - 2.0 %    Neutrophils Absolute 8.63 (H) 1.80 - 7.30 E9/L    Immature Granulocytes # 0.02 E9/L    Lymphocytes Absolute 0.58 (L) 1.50 - 4.00 E9/L    Monocytes Absolute 0.63 0.10 - 0.95 E9/L    Eosinophils Absolute 0.02 (L) 0.05 - 0.50 E9/L    Basophils Absolute 0.02 0.00 - 0.20 E9/L    Poikilocytes 2+     [place] Cells 2+     Ovalocytes 1+    Lipase   Result Value Ref Range    Lipase 22 13 - 60 U/L   Magnesium   Result Value Ref Range    Magnesium 2.2 1.6 - 2.6 mg/dL   SPECIMEN REJECTION   Result Value Ref Range    Rejected Test cmp     Reason for Rejection see below    Comprehensive metabolic panel   Result Value Ref Range    Sodium 138 132 - 146 mmol/L    Potassium 3.6 3.5 - 5.0 mmol/L    Chloride 101 98 - 107 mmol/L    CO2 23 22 - 29 mmol/L    Anion Gap 14 7 - 16 mmol/L    Glucose 249 (H) 74 - 99 mg/dL    BUN 16 6 - 23 mg/dL    CREATININE 0.7 0.5 - 1.0 mg/dL    GFR Non-African American >60 >=60 mL/min/1.73    GFR African American >60     Calcium 9.1 8.6 - 10.2 mg/dL    Total Protein 7.6 6.4 - 8.3 g/dL    Albumin 4.0 3.5 - 5.2 g/dL    Total Bilirubin 0.5 0.0 - 1.2 mg/dL    Alkaline Phosphatase 130 (H) 35 - 104 U/L    ALT 19 0 - 32 U/L    AST 27 0 - 31 U/L   TYPE AND SCREEN   Result Value Ref Range    ABO/Rh A POS     Antibody Screen NEG    ,       RADIOLOGY:  Interpreted by Radiologist unless otherwise specified  No orders to display         ------------------------- NURSING NOTES AND VITALS REVIEWED ---------------------------   The nursing notes within the ED encounter and vital signs as below have been reviewed by myself  /73   Pulse 97   Temp 98.5 °F (36.9 °C) (Oral)   Resp 16   Wt 160 lb (72.6 kg)   SpO2 93%   BMI 27.46 kg/m²     Oxygen Saturation Interpretation: Normal    The patients available past medical records and past encounters were reviewed. ------------------------------ ED COURSE/MEDICAL DECISION MAKING----------------------  Medications   ondansetron (ZOFRAN) injection 4 mg (4 mg IntraVENous Given 1/30/22 1525)   famotidine (PEPCID) injection 20 mg (20 mg IntraVENous Given 1/30/22 1524)        Re-Evaluations: This patient's ED course included:a personal history and physicial examination and re-evaluation prior to disposition    This patient has remained hemodynamically stable during their ED course. Consultations:  None    Medical Decision Making:   Patient presents with concern for possibility of GI bleeding. Vital signs interpreted by myself as normal aside from mild tachycardia at 105. She is slightly hypoxic at 94%. History and physical examination findings consistent with multiple differential diagnosis considered including gastritis, gastric ulcer and GI bleeding. Patient has no complaints at this time and is happily demented. Labs/imaging: Stable none not concerning for any acute anemia. Reassessment patient has a benign abdominal exam.  She has not exhibited any nausea or emesis here in the emergency department. She expresses a desire to be discharged back to her nursing facility. Counseling: The emergency provider has spoken with the patient and discussed todays results, in addition to providing specific details for the plan of care and counseling regarding the diagnosis and prognosis. Questions are answered at this time and they are agreeable with the plan.     --------------------------------- IMPRESSION AND DISPOSITION ---------------------------------    IMPRESSION  1. Non-intractable vomiting with nausea, unspecified vomiting type      DISPOSITION  Disposition: Discharge to home  Patient condition is stable    IDr. Darien am the primary provider of record    Darien Becerril DO  Emergency Medicine    NOTE: This report was transcribed using voice recognition software.  Every effort was made to ensure accuracy; however, inadvertent computerized transcription errors may be present       Maile Jewell DO  01/31/22 1101

## 2022-01-31 NOTE — ED NOTES
[facility] notified of need for transport back to [facility]. ETA 2-3 hours. Approximately 2330.      Lorin Sullivan RN  01/30/22 2029

## 2022-01-31 NOTE — ED NOTES
Two additional attempts made to call report at [facility]. No answer.       Melisa Paris, NETO  01/30/22 4590

## 2022-01-31 NOTE — ED NOTES
Attempted to call report to [facility]. No staff available.      Vitaliy Heck, RN  01/30/22 8747

## 2022-02-02 ENCOUNTER — HOSPITAL ENCOUNTER (EMERGENCY)
Age: 87
Discharge: HOME | End: 2022-02-02
Attending: EMERGENCY MEDICINE
Payer: MEDICARE

## 2022-02-02 VITALS
BODY MASS INDEX: 27.31 KG/M2 | WEIGHT: 160 LBS | RESPIRATION RATE: 16 BRPM | DIASTOLIC BLOOD PRESSURE: 62 MMHG | OXYGEN SATURATION: 93 % | TEMPERATURE: 98.1 F | HEART RATE: 81 BPM | HEIGHT: 64 IN | SYSTOLIC BLOOD PRESSURE: 129 MMHG

## 2022-02-02 DIAGNOSIS — K92.2 GASTROINTESTINAL HEMORRHAGE, UNSPECIFIED GASTROINTESTINAL HEMORRHAGE TYPE: ICD-10-CM

## 2022-02-02 DIAGNOSIS — E87.6 HYPOKALEMIA: Primary | ICD-10-CM

## 2022-02-02 LAB
ABO/RH: NORMAL
ALBUMIN SERPL-MCNC: 4 G/DL (ref 3.5–5.2)
ALP BLD-CCNC: 116 U/L (ref 35–104)
ALT SERPL-CCNC: 18 U/L (ref 0–32)
ANION GAP SERPL CALCULATED.3IONS-SCNC: 14 MMOL/L (ref 7–16)
ANTIBODY SCREEN: NORMAL
APTT: 31.2 SEC (ref 24.5–35.1)
AST SERPL-CCNC: 24 U/L (ref 0–31)
BASOPHILS ABSOLUTE: 0.03 E9/L (ref 0–0.2)
BASOPHILS RELATIVE PERCENT: 0.3 % (ref 0–2)
BILIRUB SERPL-MCNC: 0.4 MG/DL (ref 0–1.2)
BUN BLDV-MCNC: 22 MG/DL (ref 6–23)
CALCIUM SERPL-MCNC: 9.3 MG/DL (ref 8.6–10.2)
CHLORIDE BLD-SCNC: 97 MMOL/L (ref 98–107)
CO2: 24 MMOL/L (ref 22–29)
CREAT SERPL-MCNC: 0.7 MG/DL (ref 0.5–1)
EOSINOPHILS ABSOLUTE: 0.15 E9/L (ref 0.05–0.5)
EOSINOPHILS RELATIVE PERCENT: 1.5 % (ref 0–6)
GFR AFRICAN AMERICAN: >60
GFR NON-AFRICAN AMERICAN: >60 ML/MIN/1.73
GLUCOSE BLD-MCNC: 203 MG/DL (ref 74–99)
HCT VFR BLD CALC: 43.6 % (ref 34–48)
HEMOGLOBIN: 14.5 G/DL (ref 11.5–15.5)
IMMATURE GRANULOCYTES #: 0.03 E9/L
IMMATURE GRANULOCYTES %: 0.3 % (ref 0–5)
INR BLD: 1.4
LIPASE: 19 U/L (ref 13–60)
LYMPHOCYTES ABSOLUTE: 2.01 E9/L (ref 1.5–4)
LYMPHOCYTES RELATIVE PERCENT: 19.5 % (ref 20–42)
MAGNESIUM: 2.1 MG/DL (ref 1.6–2.6)
MCH RBC QN AUTO: 28.2 PG (ref 26–35)
MCHC RBC AUTO-ENTMCNC: 33.3 % (ref 32–34.5)
MCV RBC AUTO: 84.7 FL (ref 80–99.9)
MONOCYTES ABSOLUTE: 0.82 E9/L (ref 0.1–0.95)
MONOCYTES RELATIVE PERCENT: 8 % (ref 2–12)
NEUTROPHILS ABSOLUTE: 7.25 E9/L (ref 1.8–7.3)
NEUTROPHILS RELATIVE PERCENT: 70.4 % (ref 43–80)
PDW BLD-RTO: 14 FL (ref 11.5–15)
PLATELET # BLD: 347 E9/L (ref 130–450)
PMV BLD AUTO: 9.8 FL (ref 7–12)
POTASSIUM REFLEX MAGNESIUM: 3.1 MMOL/L (ref 3.5–5)
PROTHROMBIN TIME: 15.5 SEC (ref 9.3–12.4)
RBC # BLD: 5.15 E12/L (ref 3.5–5.5)
SODIUM BLD-SCNC: 135 MMOL/L (ref 132–146)
TOTAL PROTEIN: 7.3 G/DL (ref 6.4–8.3)
WBC # BLD: 10.3 E9/L (ref 4.5–11.5)

## 2022-02-02 PROCEDURE — A4216 STERILE WATER/SALINE, 10 ML: HCPCS | Performed by: STUDENT IN AN ORGANIZED HEALTH CARE EDUCATION/TRAINING PROGRAM

## 2022-02-02 PROCEDURE — 6360000002 HC RX W HCPCS: Performed by: STUDENT IN AN ORGANIZED HEALTH CARE EDUCATION/TRAINING PROGRAM

## 2022-02-02 PROCEDURE — 86901 BLOOD TYPING SEROLOGIC RH(D): CPT

## 2022-02-02 PROCEDURE — 86900 BLOOD TYPING SEROLOGIC ABO: CPT

## 2022-02-02 PROCEDURE — 96375 TX/PRO/DX INJ NEW DRUG ADDON: CPT

## 2022-02-02 PROCEDURE — 96365 THER/PROPH/DIAG IV INF INIT: CPT

## 2022-02-02 PROCEDURE — 86850 RBC ANTIBODY SCREEN: CPT

## 2022-02-02 PROCEDURE — 80053 COMPREHEN METABOLIC PANEL: CPT

## 2022-02-02 PROCEDURE — 85730 THROMBOPLASTIN TIME PARTIAL: CPT

## 2022-02-02 PROCEDURE — 6370000000 HC RX 637 (ALT 250 FOR IP): Performed by: STUDENT IN AN ORGANIZED HEALTH CARE EDUCATION/TRAINING PROGRAM

## 2022-02-02 PROCEDURE — 83735 ASSAY OF MAGNESIUM: CPT

## 2022-02-02 PROCEDURE — C9113 INJ PANTOPRAZOLE SODIUM, VIA: HCPCS | Performed by: STUDENT IN AN ORGANIZED HEALTH CARE EDUCATION/TRAINING PROGRAM

## 2022-02-02 PROCEDURE — 99284 EMERGENCY DEPT VISIT MOD MDM: CPT

## 2022-02-02 PROCEDURE — 96366 THER/PROPH/DIAG IV INF ADDON: CPT

## 2022-02-02 PROCEDURE — 85610 PROTHROMBIN TIME: CPT

## 2022-02-02 PROCEDURE — 2580000003 HC RX 258: Performed by: STUDENT IN AN ORGANIZED HEALTH CARE EDUCATION/TRAINING PROGRAM

## 2022-02-02 PROCEDURE — 85025 COMPLETE CBC W/AUTO DIFF WBC: CPT

## 2022-02-02 PROCEDURE — 83690 ASSAY OF LIPASE: CPT

## 2022-02-02 RX ORDER — TRAMADOL HYDROCHLORIDE 50 MG/1
50 TABLET ORAL EVERY 12 HOURS PRN
COMMUNITY

## 2022-02-02 RX ORDER — ONDANSETRON 2 MG/ML
4 INJECTION INTRAMUSCULAR; INTRAVENOUS ONCE
Status: COMPLETED | OUTPATIENT
Start: 2022-02-02 | End: 2022-02-02

## 2022-02-02 RX ORDER — INSULIN DETEMIR 100 [IU]/ML
25 INJECTION, SOLUTION SUBCUTANEOUS DAILY
Status: ON HOLD | COMMUNITY
End: 2022-10-18 | Stop reason: HOSPADM

## 2022-02-02 RX ORDER — SODIUM CHLORIDE 9 MG/ML
10 INJECTION INTRAVENOUS DAILY
Status: DISCONTINUED | OUTPATIENT
Start: 2022-02-02 | End: 2022-02-02 | Stop reason: HOSPADM

## 2022-02-02 RX ORDER — PANTOPRAZOLE SODIUM 40 MG/10ML
40 INJECTION, POWDER, LYOPHILIZED, FOR SOLUTION INTRAVENOUS DAILY
Status: DISCONTINUED | OUTPATIENT
Start: 2022-02-02 | End: 2022-02-02 | Stop reason: HOSPADM

## 2022-02-02 RX ORDER — POTASSIUM CHLORIDE 1.5 G/1.77G
40 POWDER, FOR SOLUTION ORAL ONCE
Status: DISCONTINUED | OUTPATIENT
Start: 2022-02-02 | End: 2022-02-02 | Stop reason: CLARIF

## 2022-02-02 RX ORDER — POTASSIUM CHLORIDE 7.45 MG/ML
10 INJECTION INTRAVENOUS ONCE
Status: COMPLETED | OUTPATIENT
Start: 2022-02-02 | End: 2022-02-02

## 2022-02-02 RX ORDER — 0.9 % SODIUM CHLORIDE 0.9 %
1000 INTRAVENOUS SOLUTION INTRAVENOUS ONCE
Status: COMPLETED | OUTPATIENT
Start: 2022-02-02 | End: 2022-02-02

## 2022-02-02 RX ADMIN — POTASSIUM BICARBONATE 40 MEQ: 782 TABLET, EFFERVESCENT ORAL at 10:09

## 2022-02-02 RX ADMIN — POTASSIUM CHLORIDE 10 MEQ: 7.46 INJECTION, SOLUTION INTRAVENOUS at 10:08

## 2022-02-02 RX ADMIN — SODIUM CHLORIDE 1000 ML: 9 INJECTION, SOLUTION INTRAVENOUS at 08:31

## 2022-02-02 RX ADMIN — PANTOPRAZOLE SODIUM 40 MG: 40 INJECTION, POWDER, FOR SOLUTION INTRAVENOUS at 08:32

## 2022-02-02 RX ADMIN — SODIUM CHLORIDE 10 ML: 9 INJECTION INTRAMUSCULAR; INTRAVENOUS; SUBCUTANEOUS at 08:37

## 2022-02-02 RX ADMIN — ONDANSETRON 4 MG: 2 INJECTION INTRAMUSCULAR; INTRAVENOUS at 08:32

## 2022-02-02 ASSESSMENT — ENCOUNTER SYMPTOMS
NAUSEA: 1
BACK PAIN: 0
COUGH: 0
VOMITING: 1
DIARRHEA: 0
ABDOMINAL DISTENTION: 0
SORE THROAT: 0
ABDOMINAL PAIN: 0
CHEST TIGHTNESS: 0
SHORTNESS OF BREATH: 0

## 2022-02-02 NOTE — ED NOTES
Nurse to nurse called to [facility] [phone]     Jessica Sheehan, NETO  02/02/22 [id number] Nubia Oscar RN  02/02/22 0740

## 2022-02-02 NOTE — ED NOTES
Bed: 25  Expected date:   Expected time:   Means of arrival:   Comments:  Dianne Harris RN  02/02/22 0451

## 2022-02-02 NOTE — ED PROVIDER NOTES
HPI     Patient is a 81 y.o. female presents with a chief complaint of coffee-ground emesis  This has been occurring for a few hours. Patient states that it gets better with nothing. Patient states that it gets worse with nothing. Patient states that it is mild in severity. Patient states it was acute in onset. Patient takes Eliquis and is coming from his facility. Patient is a DNR CC. Patient is alert to self only. Patient has court appointed guardian. Patient had an episode of coffee-ground emesis prior to arrival.  Patient is denying any abdominal pain. Patient states she otherwise feels well. Patient denies any fevers, chest pain, shortness of breath, abdominal pain, change in urinary or bowel habits. Review of Systems   Constitutional: Negative for activity change, appetite change, chills, fatigue and fever. HENT: Negative for congestion, drooling and sore throat. Respiratory: Negative for cough, chest tightness and shortness of breath. Cardiovascular: Negative for chest pain and palpitations. Gastrointestinal: Positive for nausea and vomiting. Negative for abdominal distention, abdominal pain and diarrhea. Genitourinary: Negative for decreased urine volume, difficulty urinating, enuresis, flank pain, frequency and hematuria. Musculoskeletal: Negative for arthralgias, back pain and neck stiffness. Skin: Negative for rash and wound. Neurological: Negative for dizziness, facial asymmetry, light-headedness and headaches. Psychiatric/Behavioral: Negative for agitation, confusion and decreased concentration. Physical Exam  Vitals and nursing note reviewed. Constitutional:       Appearance: She is well-developed. HENT:      Head: Normocephalic and atraumatic. Mouth/Throat:      Comments: Patient has no teeth. Eyes:      Conjunctiva/sclera: Conjunctivae normal.   Cardiovascular:      Rate and Rhythm: Normal rate and regular rhythm.       Heart sounds: Normal heart sounds. No murmur heard. Pulmonary:      Effort: Pulmonary effort is normal. No respiratory distress. Breath sounds: Normal breath sounds. No wheezing or rales. Abdominal:      General: Bowel sounds are normal.      Palpations: Abdomen is soft. Tenderness: There is no abdominal tenderness. There is no guarding or rebound. Comments: No abdominal tenderness to deep palpation   Genitourinary:     Rectum: Guaiac result positive. Musculoskeletal:      Cervical back: Normal range of motion and neck supple. Skin:     General: Skin is warm and dry. Neurological:      Mental Status: She is alert and oriented to person, place, and time. Cranial Nerves: No cranial nerve deficit. Coordination: Coordination normal.          Procedures     [facility]     ED Course as of 02/02/22 1119   Wed Feb 02, 2022   0822 Attempted to call patient's guardian that is court appointed but no answer. [JM]   200 Conversation was had with patient's guardian and power of . Discussion was had about patient's goals of care. Stated that she would not want patient to receive endoscopic imaging and patient would not want to be admitted to the hospital.  Patient will be sent home on a PPI and told to hold the Eliquis. Patient will follow up as an outpatient per her DNR CC. [JM]      ED Course User Index  [JM] Laurie Thorpe MD      Patient is a 81 y.o. female presenting with hematemesis. Patient takes Eliquis. Patient had no hematemesis on arrival.  Patient had normal vitals. Patient had labs done. Patient has no abdominal pain. Patient's labs are benign. Patient's hemoglobin is normal.  Patient is not hypotensive. Patient has mildly guaiac positive. Patient is a DNR CC. Extensive discussion was had with patient's power of  and patient's goals of care. Patient would not want to have invasive treatment. Patient herself is alert to self only but states that she would like to go home.   Given patient's goals of care patient will be told to hold Eliquis and return home. Patient is agreeable to this plan. Patient was given return precautions. Patient will follow up with their primary care provider. Patient is agreeable to this plan. Patient has remained stable throughout their stay in the ED. Patient was seen and evaluated by myself and my attending Tony Farrell MD. Assessment and Plan discussed with attending provider, please see attestation for final plan of care. This note was done using dictation software and there may be some grammatical errors associated with this. Lexie Salinas MD       ED Course as of 02/02/22 1229   Wed Feb 02, 2022   0822 Attempted to call patient's guardian that is court appointed but no answer. [JM]   200 Conversation was had with patient's guardian and power of . Discussion was had about patient's goals of care. Stated that she would not want patient to receive endoscopic imaging and patient would not want to be admitted to the hospital.  Patient will be sent home on a PPI and told to hold the Eliquis. Patient will follow up as an outpatient per her DNR CC. [JM]      ED Course User Index  [JM] Lexie Salinas MD       --------------------------------------------- PAST HISTORY ---------------------------------------------  Past Medical History:  has a past medical history of Closed right hip fracture ([facility] [address] Dementia ([facility] [address] DVT (deep venous thrombosis) ([facility] [address] GERD (gastroesophageal reflux disease), Hyperlipidemia, Hypertension, Left scapula fracture, Metatarsal fracture, Moderate protein malnutrition ([address] Multiple fractures, Radial styloid fracture, and Type II or unspecified type diabetes mellitus without mention of complication, not stated as uncontrolled. 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 Social History:  reports that she has never smoked. Her smokeless tobacco use includes chew. She reports that she does not drink alcohol and does not use drugs. Family History: family history is not on file. The patients home medications have been reviewed. Allergies: Patient has no known allergies.     -------------------------------------------------- RESULTS -------------------------------------------------  Labs:  Results for orders placed or performed during the hospital encounter of 02/02/22   CBC Auto Differential   Result Value Ref Range    WBC 10.3 4.5 - 11.5 E9/L    RBC 5.15 3.50 - 5.50 E12/L    Hemoglobin 14.5 11.5 - 15.5 g/dL    Hematocrit 43.6 34.0 - 48.0 %    MCV 84.7 80.0 - 99.9 fL    MCH 28.2 26.0 - 35.0 pg    MCHC 33.3 32.0 - 34.5 %    RDW 14.0 11.5 - 15.0 fL    Platelets 999 374 - 721 E9/L    MPV 9.8 7.0 - 12.0 fL    Neutrophils % 70.4 43.0 - 80.0 %    Immature Granulocytes % 0.3 0.0 - 5.0 %    Lymphocytes % 19.5 (L) 20.0 - 42.0 %    Monocytes % 8.0 2.0 - 12.0 %    Eosinophils % 1.5 0.0 - 6.0 %    Basophils % 0.3 0.0 - 2.0 %    Neutrophils Absolute 7.25 1.80 - 7.30 E9/L    Immature Granulocytes # 0.03 E9/L    Lymphocytes Absolute 2.01 1.50 - 4.00 E9/L    Monocytes Absolute 0.82 0.10 - 0.95 E9/L    Eosinophils Absolute 0.15 0.05 - 0.50 E9/L    Basophils Absolute 0.03 0.00 - 0.20 E9/L   Comprehensive Metabolic Panel w/ Reflex to MG   Result Value Ref Range    Sodium 135 132 - 146 mmol/L    Potassium reflex Magnesium 3.1 (L) 3.5 - 5.0 mmol/L    Chloride 97 (L) 98 - 107 mmol/L    CO2 24 22 - 29 mmol/L    Anion Gap 14 7 - 16 mmol/L    Glucose 203 (H) 74 - 99 mg/dL    BUN 22 6 - 23 mg/dL    CREATININE 0.7 0.5 - 1.0 mg/dL    GFR Non-African American >60 >=60 mL/min/1.73    GFR African American >60     Calcium 9.3 8.6 - 10.2 mg/dL    Total Protein 7.3 6.4 - 8.3 g/dL    Albumin 4.0 3.5 - 5.2 g/dL    Total Bilirubin 0.4 0.0 - 1.2 mg/dL    Alkaline Phosphatase 116 (H) 35 - 104 U/L    ALT 18 0 - 32 U/L    AST 24 0 - 31 U/L   Lipase   Result Value Ref Range    Lipase 19 13 - 60 U/L   Protime-INR   Result Value Ref Range    Protime 15.5 (H) 9.3 - 12.4 sec    INR 1.4    APTT   Result Value Ref Range    aPTT 31.2 24.5 - 35.1 sec   Magnesium   Result Value Ref Range    Magnesium 2.1 1.6 - 2.6 mg/dL   TYPE AND SCREEN   Result Value Ref Range    ABO/Rh A POS     Antibody Screen NEG        Radiology:  No orders to display       ------------------------- NURSING NOTES AND VITALS REVIEWED ---------------------------  Date / Time Roomed:  2/2/2022  7:33 AM  ED Bed Assignment:  25/25    The nursing notes within the ED encounter and vital signs as below have been reviewed. /62   Pulse 81   Temp 98.1 °F (36.7 °C) (Oral)   Resp 16   Ht 5' 4\" (1.626 m)   Wt 160 lb (72.6 kg)   SpO2 93%   BMI 27.46 kg/m²   Oxygen Saturation Interpretation: Normal      ------------------------------------------ PROGRESS NOTES ------------------------------------------  12:29 PM EST  I have spoken with the patient and family if present and discussed todays results, in addition to providing specific details for the plan of care and counseling regarding the diagnosis and prognosis. Their questions are answered at this time and they are agreeable with the plan. I discussed at length with them reasons for immediate return here for re evaluation. They will followup with their primary care provider by calling their office as soon as possible.      --------------------------------- ADDITIONAL PROVIDER NOTES ---------------------------------  At this time the patient is without objective evidence of an acute process requiring hospitalization or inpatient management. They have remained hemodynamically stable throughout their entire ED visit and are stable for discharge with outpatient follow-up.      The plan has been discussed in detail and they are aware of the specific conditions for emergent return, as well as the importance of follow-up. New Prescriptions    No medications on file       Diagnosis:  1. Hypokalemia    2. Gastrointestinal hemorrhage, unspecified gastrointestinal hemorrhage type        Disposition:  Patient's disposition: Discharge to nursing home  Patient's condition is stable.          Garrie Ormond, MD  Resident  02/02/22 3658

## 2022-02-02 NOTE — CARE COORDINATION
Social Work / Discharge Planning:    Patient presented to the emergency department for coffee-ground emesis from [facility]. She is discharged and social work will arrange transport back to facility. [facility] contacted and patient will return to [facility] via stretcher.  Transportation envelope completed and nursing updated on ETA 12:30 - 1:00PM. Electronically signed by BRISEIDA Hannon, MARC on 2/2/22 at 11:48 AM EST

## 2022-10-14 ENCOUNTER — HOSPITAL ENCOUNTER (INPATIENT)
Age: 87
LOS: 3 days | Discharge: NURSING, REHAB OR LONG-TERM CARE | DRG: 378 | End: 2022-10-18
Attending: EMERGENCY MEDICINE | Admitting: INTERNAL MEDICINE
Payer: MEDICARE

## 2022-10-14 DIAGNOSIS — K92.0 HEMATEMESIS, UNSPECIFIED WHETHER NAUSEA PRESENT: Primary | ICD-10-CM

## 2022-10-14 DIAGNOSIS — R52 PAIN: ICD-10-CM

## 2022-10-14 LAB
BASOPHILS ABSOLUTE: 0.11 E9/L (ref 0–0.2)
BASOPHILS RELATIVE PERCENT: 0.9 % (ref 0–2)
EOSINOPHILS ABSOLUTE: 0.34 E9/L (ref 0.05–0.5)
EOSINOPHILS RELATIVE PERCENT: 2.7 % (ref 0–6)
HCT VFR BLD CALC: 36.4 % (ref 34–48)
HEMOGLOBIN: 12.1 G/DL (ref 11.5–15.5)
IMMATURE GRANULOCYTES #: 0.05 E9/L
IMMATURE GRANULOCYTES %: 0.4 % (ref 0–5)
LYMPHOCYTES ABSOLUTE: 2.7 E9/L (ref 1.5–4)
LYMPHOCYTES RELATIVE PERCENT: 21.8 % (ref 20–42)
MCH RBC QN AUTO: 29.2 PG (ref 26–35)
MCHC RBC AUTO-ENTMCNC: 33.2 % (ref 32–34.5)
MCV RBC AUTO: 87.9 FL (ref 80–99.9)
MONOCYTES ABSOLUTE: 1.01 E9/L (ref 0.1–0.95)
MONOCYTES RELATIVE PERCENT: 8.2 % (ref 2–12)
NEUTROPHILS ABSOLUTE: 8.18 E9/L (ref 1.8–7.3)
NEUTROPHILS RELATIVE PERCENT: 66 % (ref 43–80)
PDW BLD-RTO: 13.8 FL (ref 11.5–15)
PLATELET # BLD: 330 E9/L (ref 130–450)
PMV BLD AUTO: 9.6 FL (ref 7–12)
RBC # BLD: 4.14 E12/L (ref 3.5–5.5)
WBC # BLD: 12.4 E9/L (ref 4.5–11.5)

## 2022-10-14 PROCEDURE — 2580000003 HC RX 258

## 2022-10-14 PROCEDURE — 99285 EMERGENCY DEPT VISIT HI MDM: CPT

## 2022-10-14 PROCEDURE — 85610 PROTHROMBIN TIME: CPT

## 2022-10-14 PROCEDURE — 83690 ASSAY OF LIPASE: CPT

## 2022-10-14 PROCEDURE — 80053 COMPREHEN METABOLIC PANEL: CPT

## 2022-10-14 PROCEDURE — 85025 COMPLETE CBC W/AUTO DIFF WBC: CPT

## 2022-10-14 RX ORDER — 0.9 % SODIUM CHLORIDE 0.9 %
500 INTRAVENOUS SOLUTION INTRAVENOUS ONCE
Status: COMPLETED | OUTPATIENT
Start: 2022-10-14 | End: 2022-10-15

## 2022-10-14 RX ADMIN — SODIUM CHLORIDE 500 ML: 9 INJECTION, SOLUTION INTRAVENOUS at 23:53

## 2022-10-14 ASSESSMENT — PAIN SCALES - GENERAL: PAINLEVEL_OUTOF10: 0

## 2022-10-14 ASSESSMENT — PAIN - FUNCTIONAL ASSESSMENT: PAIN_FUNCTIONAL_ASSESSMENT: NONE - DENIES PAIN

## 2022-10-15 ENCOUNTER — APPOINTMENT (OUTPATIENT)
Dept: CT IMAGING | Age: 87
DRG: 378 | End: 2022-10-15
Payer: MEDICARE

## 2022-10-15 PROBLEM — K92.0 HEMATEMESIS: Status: ACTIVE | Noted: 2022-10-15

## 2022-10-15 PROBLEM — K92.2 UPPER GI BLEED: Status: ACTIVE | Noted: 2022-10-15

## 2022-10-15 LAB
ALBUMIN SERPL-MCNC: 3.9 G/DL (ref 3.5–5.2)
ALP BLD-CCNC: 87 U/L (ref 35–104)
ALT SERPL-CCNC: 13 U/L (ref 0–32)
ANION GAP SERPL CALCULATED.3IONS-SCNC: 14 MMOL/L (ref 7–16)
APTT: 32.2 SEC (ref 24.5–35.1)
AST SERPL-CCNC: 19 U/L (ref 0–31)
BILIRUB SERPL-MCNC: 0.2 MG/DL (ref 0–1.2)
BUN BLDV-MCNC: 17 MG/DL (ref 6–23)
CALCIUM SERPL-MCNC: 9.6 MG/DL (ref 8.6–10.2)
CHLORIDE BLD-SCNC: 99 MMOL/L (ref 98–107)
CO2: 21 MMOL/L (ref 22–29)
CREAT SERPL-MCNC: 0.6 MG/DL (ref 0.5–1)
GFR AFRICAN AMERICAN: >60
GFR NON-AFRICAN AMERICAN: >60 ML/MIN/1.73
GLUCOSE BLD-MCNC: 205 MG/DL (ref 74–99)
HBA1C MFR BLD: 7.7 % (ref 4–5.6)
HCT VFR BLD CALC: 32.4 % (ref 34–48)
HCT VFR BLD CALC: 32.4 % (ref 34–48)
HCT VFR BLD CALC: 34.9 % (ref 34–48)
HEMOGLOBIN: 10.6 G/DL (ref 11.5–15.5)
HEMOGLOBIN: 10.7 G/DL (ref 11.5–15.5)
HEMOGLOBIN: 11.3 G/DL (ref 11.5–15.5)
INR BLD: 1.3
INR BLD: 1.4
IRON SATURATION: 50 % (ref 15–50)
IRON: 133 MCG/DL (ref 37–145)
LIPASE: 31 U/L (ref 13–60)
METER GLUCOSE: 113 MG/DL (ref 74–99)
METER GLUCOSE: 119 MG/DL (ref 74–99)
METER GLUCOSE: 124 MG/DL (ref 74–99)
METER GLUCOSE: 149 MG/DL (ref 74–99)
METER GLUCOSE: 221 MG/DL (ref 74–99)
POTASSIUM REFLEX MAGNESIUM: 3.8 MMOL/L (ref 3.5–5)
PROTHROMBIN TIME: 15.1 SEC (ref 9.3–12.4)
PROTHROMBIN TIME: 15.9 SEC (ref 9.3–12.4)
SODIUM BLD-SCNC: 134 MMOL/L (ref 132–146)
TOTAL IRON BINDING CAPACITY: 265 MCG/DL (ref 250–450)
TOTAL PROTEIN: 7.1 G/DL (ref 6.4–8.3)

## 2022-10-15 PROCEDURE — 83550 IRON BINDING TEST: CPT

## 2022-10-15 PROCEDURE — 83540 ASSAY OF IRON: CPT

## 2022-10-15 PROCEDURE — 85730 THROMBOPLASTIN TIME PARTIAL: CPT

## 2022-10-15 PROCEDURE — 36415 COLL VENOUS BLD VENIPUNCTURE: CPT

## 2022-10-15 PROCEDURE — 1200000000 HC SEMI PRIVATE

## 2022-10-15 PROCEDURE — 2580000003 HC RX 258: Performed by: INTERNAL MEDICINE

## 2022-10-15 PROCEDURE — 74177 CT ABD & PELVIS W/CONTRAST: CPT

## 2022-10-15 PROCEDURE — 87070 CULTURE OTHR SPECIMN AEROBIC: CPT

## 2022-10-15 PROCEDURE — A4216 STERILE WATER/SALINE, 10 ML: HCPCS | Performed by: INTERNAL MEDICINE

## 2022-10-15 PROCEDURE — 6370000000 HC RX 637 (ALT 250 FOR IP): Performed by: INTERNAL MEDICINE

## 2022-10-15 PROCEDURE — 85610 PROTHROMBIN TIME: CPT

## 2022-10-15 PROCEDURE — 87147 CULTURE TYPE IMMUNOLOGIC: CPT

## 2022-10-15 PROCEDURE — 99220 PR INITIAL OBSERVATION CARE/DAY 70 MINUTES: CPT | Performed by: INTERNAL MEDICINE

## 2022-10-15 PROCEDURE — C9113 INJ PANTOPRAZOLE SODIUM, VIA: HCPCS

## 2022-10-15 PROCEDURE — 6360000002 HC RX W HCPCS: Performed by: EMERGENCY MEDICINE

## 2022-10-15 PROCEDURE — 6360000004 HC RX CONTRAST MEDICATION: Performed by: RADIOLOGY

## 2022-10-15 PROCEDURE — 6360000002 HC RX W HCPCS: Performed by: INTERNAL MEDICINE

## 2022-10-15 PROCEDURE — 85018 HEMOGLOBIN: CPT

## 2022-10-15 PROCEDURE — 6360000002 HC RX W HCPCS

## 2022-10-15 PROCEDURE — 85014 HEMATOCRIT: CPT

## 2022-10-15 PROCEDURE — C9113 INJ PANTOPRAZOLE SODIUM, VIA: HCPCS | Performed by: INTERNAL MEDICINE

## 2022-10-15 PROCEDURE — 83036 HEMOGLOBIN GLYCOSYLATED A1C: CPT

## 2022-10-15 PROCEDURE — 82962 GLUCOSE BLOOD TEST: CPT

## 2022-10-15 RX ORDER — INSULIN GLARGINE 100 [IU]/ML
10 INJECTION, SOLUTION SUBCUTANEOUS NIGHTLY
Status: DISCONTINUED | OUTPATIENT
Start: 2022-10-15 | End: 2022-10-18 | Stop reason: HOSPADM

## 2022-10-15 RX ORDER — INSULIN LISPRO 100 [IU]/ML
0-4 INJECTION, SOLUTION INTRAVENOUS; SUBCUTANEOUS EVERY 4 HOURS
Status: DISCONTINUED | OUTPATIENT
Start: 2022-10-15 | End: 2022-10-16 | Stop reason: ALTCHOICE

## 2022-10-15 RX ORDER — SODIUM CHLORIDE 9 MG/ML
INJECTION, SOLUTION INTRAVENOUS CONTINUOUS
Status: DISCONTINUED | OUTPATIENT
Start: 2022-10-15 | End: 2022-10-18

## 2022-10-15 RX ORDER — ONDANSETRON 2 MG/ML
4 INJECTION INTRAMUSCULAR; INTRAVENOUS EVERY 6 HOURS PRN
Status: DISCONTINUED | OUTPATIENT
Start: 2022-10-15 | End: 2022-10-18 | Stop reason: HOSPADM

## 2022-10-15 RX ORDER — SODIUM CHLORIDE 0.9 % (FLUSH) 0.9 %
5-40 SYRINGE (ML) INJECTION PRN
Status: DISCONTINUED | OUTPATIENT
Start: 2022-10-15 | End: 2022-10-18 | Stop reason: HOSPADM

## 2022-10-15 RX ORDER — ACETAMINOPHEN 325 MG/1
650 TABLET ORAL EVERY 6 HOURS PRN
Status: DISCONTINUED | OUTPATIENT
Start: 2022-10-15 | End: 2022-10-18 | Stop reason: HOSPADM

## 2022-10-15 RX ORDER — MORPHINE SULFATE 2 MG/ML
2 INJECTION, SOLUTION INTRAMUSCULAR; INTRAVENOUS
Status: DISCONTINUED | OUTPATIENT
Start: 2022-10-15 | End: 2022-10-18 | Stop reason: HOSPADM

## 2022-10-15 RX ORDER — ACETAMINOPHEN 650 MG/1
650 SUPPOSITORY RECTAL EVERY 6 HOURS PRN
Status: DISCONTINUED | OUTPATIENT
Start: 2022-10-15 | End: 2022-10-18 | Stop reason: HOSPADM

## 2022-10-15 RX ORDER — DEXTROSE MONOHYDRATE 100 MG/ML
INJECTION, SOLUTION INTRAVENOUS CONTINUOUS PRN
Status: DISCONTINUED | OUTPATIENT
Start: 2022-10-15 | End: 2022-10-18 | Stop reason: HOSPADM

## 2022-10-15 RX ORDER — SODIUM CHLORIDE 9 MG/ML
INJECTION, SOLUTION INTRAVENOUS PRN
Status: DISCONTINUED | OUTPATIENT
Start: 2022-10-15 | End: 2022-10-18 | Stop reason: HOSPADM

## 2022-10-15 RX ORDER — ONDANSETRON 4 MG/1
4 TABLET, ORALLY DISINTEGRATING ORAL EVERY 8 HOURS PRN
Status: DISCONTINUED | OUTPATIENT
Start: 2022-10-15 | End: 2022-10-18 | Stop reason: HOSPADM

## 2022-10-15 RX ORDER — MEMANTINE HYDROCHLORIDE 10 MG/1
10 TABLET ORAL 2 TIMES DAILY
Status: DISCONTINUED | OUTPATIENT
Start: 2022-10-15 | End: 2022-10-18 | Stop reason: HOSPADM

## 2022-10-15 RX ORDER — LORAZEPAM 2 MG/ML
0.5 INJECTION INTRAMUSCULAR EVERY 6 HOURS PRN
Status: DISCONTINUED | OUTPATIENT
Start: 2022-10-15 | End: 2022-10-18 | Stop reason: HOSPADM

## 2022-10-15 RX ORDER — PANTOPRAZOLE SODIUM 40 MG/10ML
80 INJECTION, POWDER, LYOPHILIZED, FOR SOLUTION INTRAVENOUS ONCE
Status: COMPLETED | OUTPATIENT
Start: 2022-10-15 | End: 2022-10-15

## 2022-10-15 RX ORDER — SODIUM CHLORIDE 0.9 % (FLUSH) 0.9 %
5-40 SYRINGE (ML) INJECTION EVERY 12 HOURS SCHEDULED
Status: DISCONTINUED | OUTPATIENT
Start: 2022-10-15 | End: 2022-10-18 | Stop reason: HOSPADM

## 2022-10-15 RX ORDER — PANTOPRAZOLE SODIUM 40 MG/10ML
INJECTION, POWDER, LYOPHILIZED, FOR SOLUTION INTRAVENOUS
Status: DISCONTINUED
Start: 2022-10-15 | End: 2022-10-15 | Stop reason: WASHOUT

## 2022-10-15 RX ORDER — ONDANSETRON 2 MG/ML
4 INJECTION INTRAMUSCULAR; INTRAVENOUS ONCE
Status: COMPLETED | OUTPATIENT
Start: 2022-10-15 | End: 2022-10-15

## 2022-10-15 RX ADMIN — INSULIN LISPRO 1 UNITS: 100 INJECTION, SOLUTION INTRAVENOUS; SUBCUTANEOUS at 06:23

## 2022-10-15 RX ADMIN — PETROLATUM: 420 OINTMENT TOPICAL at 18:19

## 2022-10-15 RX ADMIN — ONDANSETRON 4 MG: 2 INJECTION INTRAMUSCULAR; INTRAVENOUS at 03:29

## 2022-10-15 RX ADMIN — SODIUM CHLORIDE: 9 INJECTION, SOLUTION INTRAVENOUS at 17:53

## 2022-10-15 RX ADMIN — SODIUM CHLORIDE, PRESERVATIVE FREE 40 MG: 5 INJECTION INTRAVENOUS at 10:29

## 2022-10-15 RX ADMIN — INSULIN GLARGINE 10 UNITS: 100 INJECTION, SOLUTION SUBCUTANEOUS at 21:42

## 2022-10-15 RX ADMIN — SODIUM CHLORIDE: 9 INJECTION, SOLUTION INTRAVENOUS at 06:25

## 2022-10-15 RX ADMIN — LORAZEPAM 0.5 MG: 2 INJECTION INTRAMUSCULAR; INTRAVENOUS at 21:40

## 2022-10-15 RX ADMIN — PANTOPRAZOLE SODIUM 80 MG: 40 INJECTION, POWDER, FOR SOLUTION INTRAVENOUS at 03:28

## 2022-10-15 RX ADMIN — IOPAMIDOL 75 ML: 755 INJECTION, SOLUTION INTRAVENOUS at 01:48

## 2022-10-15 RX ADMIN — SODIUM CHLORIDE, PRESERVATIVE FREE 40 MG: 5 INJECTION INTRAVENOUS at 21:41

## 2022-10-15 RX ADMIN — PANTOPRAZOLE SODIUM 80 MG: 40 INJECTION, POWDER, LYOPHILIZED, FOR SOLUTION INTRAVENOUS at 03:28

## 2022-10-15 RX ADMIN — SODIUM CHLORIDE, PRESERVATIVE FREE 10 ML: 5 INJECTION INTRAVENOUS at 21:42

## 2022-10-15 RX ADMIN — MEMANTINE HYDROCHLORIDE 10 MG: 10 TABLET, FILM COATED ORAL at 10:30

## 2022-10-15 RX ADMIN — MEMANTINE HYDROCHLORIDE 10 MG: 10 TABLET, FILM COATED ORAL at 21:40

## 2022-10-15 ASSESSMENT — ENCOUNTER SYMPTOMS
NAUSEA: 0
BLOOD IN STOOL: 0
CHEST TIGHTNESS: 0
TROUBLE SWALLOWING: 0
ABDOMINAL PAIN: 0
ANAL BLEEDING: 0
COUGH: 0
CONSTIPATION: 0
DIARRHEA: 1
VOMITING: 0
SHORTNESS OF BREATH: 0

## 2022-10-15 ASSESSMENT — PAIN SCALES - GENERAL: PAINLEVEL_OUTOF10: 0

## 2022-10-15 NOTE — H&P
[facility] History and Physical    Perpetual assessment: 26-year-old female with past medical history of diabetes, dementia inhabited presents with coffee-ground emesis. Assessment and plan:    Upper GI bleed  -Developed multiple bouts of hematemesis  -Hemoglobin is currently stable  -No previous history of GI bleeding that final records  -His CT scan did show some signs of GERD  -We will continue to monitor H&H serially  -Start IV Protonix  -Consult GI for further evaluation consideration of endoscopy    Alzheimer's dementia  -No behavioral disturbance at the moment  -Resume the patient's Namenda    Type II IDDM  -Blood glucoses stable  -While n.p.o. we will dose reduce her Lantus to 10 units nightly  -Placed on sliding scale    Essential hypertension  -Resume medication when n.p.o. Hyperlipidemia  -Resume Pravachol when no longer n.p.o. History of DVT  -We will hold the patient's Eliquis for now    Code Status: DNR CCA  DVT prophylaxis: SCDs      CHIEF COMPLAINT: Coffee-ground emesis    History of Present Illness: This is a 26-year-old female who presents to [facility] with the above-stated complaint. All history has been taken from the medical record patient has dementia. Reportedly she had a few bouts of coffee-ground emesis at the rehab facility today. Because of that the patient was sent to the ED for evaluation. There she had a repeat episode hematemesis. It was Hemoccult positive per report. Patient's hemoglobin was stable. CT imaging did show some gastritis but no bleed. Because of her hematemesis was felt that she should be brought in for IV Protonix and possible GI consultation.     Informant(s) for H&P:    REVIEW OF SYSTEMS:  A comprehensive review of systems was negative except for: what is in the HPI      PMH:  Past Medical History:   Diagnosis Date    Closed right hip fracture ([facility] [address] 7/22/2015    Dementia ([facility] [address]     DVT (deep venous thrombosis) ([facility] [address] 11/24/2016    GERD (gastroesophageal reflux disease)     Hyperlipidemia     Hypertension     Left scapula fracture 7/22/2015    Metatarsal fracture 7/22/2015    Moderate protein malnutrition ([address] 7/16/2015    Multiple fractures     legs, arms, ribs, sternum, facial    Radial styloid fracture 7/22/2015    Type II or unspecified type diabetes mellitus without mention of complication, not stated as uncontrolled        Surgical History:  Past Surgical History:   Procedure Laterality Date    ABDOMEN SURGERY      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 Eliot Vance Right 11/28/2016    REMOVAL OLD HARDWARE RIGHT HIP;TOTAL RIGHT HIP ARTHROPLASTY    VENA CAVA FILTER PLACEMENT  12/01/2016    Aby Lozoya    VENA CAVA FILTER PLACEMENT Right 12/01/2016       Medications Prior to Admission:    Prior to Admission medications    Medication Sig Start Date End Date Taking? Authorizing Provider   carboxymethylcellulose 1 % ophthalmic solution Place 2 drops into both eyes every 4 hours as needed for Dry Eyes    Historical Provider, MD   insulin detemir (LEVEMIR) 100 UNIT/ML injection vial Inject 35 Units into the skin daily    Historical Provider, MD   traMADol (ULTRAM) 50 MG tablet Take 50 mg by mouth every 12 hours as needed for Pain.     Historical Provider, MD   insulin lispro (HUMALOG) 100 UNIT/ML injection vial Inject 0-10 Units into the skin 3 times daily (before meals) PER SLIDING SCALE: 0-149=0U, 150-200=2U, 201-250=4U, 251-300=6U, 301-350=8U, 351-399=10U, 400-999=10U & CALL MD    Historical Provider, MD   ammonium lactate (AMLACTIN) 12 % cream Apply topically every 12 hours as needed (FOR DRY SKIN OF BLE)     Historical Provider, MD   dextromethorphan-guaiFENesin (ROBITUSSIN COLD COUGH+ CHEST)  MG/5ML syrup Take 10 mLs by mouth every 4 hours as needed for Cough    Historical Provider, MD   apixaban (ELIQUIS) 2.5 MG TABS tablet Take 2.5 mg by mouth 2 times daily     Historical Provider, MD   pantoprazole (PROTONIX) 40 MG tablet Take 1 tablet by mouth every morning (before breakfast) 12/5/16   Marjan Hutosn MD   Sodium Phosphates (FLEET) 7-19 GM/118ML Place 1 enema rectally as needed (CONSTIPATION) Indications: -IF NO BM BY END OF SHIFT AFTER SUPP ADMIN-     Historical Provider, MD   bisacodyl (DULCOLAX) 10 MG suppository Place 1 suppository rectally as needed (CONSTIPATION) Indications: -NO BM IN 24HRS AFTER MOM ADMIN-     Historical Provider, MD   magnesium hydroxide (MILK OF MAGNESIA) 400 MG/5ML suspension Take 30 mLs by mouth as needed for Constipation Indications: -ADMIN IF NO BM BY 3RD DAY/9 SHIFTS-     Historical Provider, MD   memantine (NAMENDA XR) 28 MG CP24 capsule Take 28 mg by mouth at bedtime     Historical Provider, MD   acetaminophen (TYLENOL) 325 MG tablet Take 650 mg by mouth every 4 hours as needed for Pain    Historical Provider, MD   pravastatin (PRAVACHOL) 40 MG tablet Take 40 mg by mouth daily    Historical Provider, MD   amLODIPine (NORVASC) 10 MG tablet Take 10 mg by mouth daily    Historical Provider, MD       Allergies:    Patient has no known allergies. Social History:    reports that she has never smoked. Her smokeless tobacco use includes chew. She reports that she does not drink alcohol and does not use drugs. Family History:   family history is not on file.        PHYSICAL EXAM:  Vitals:  BP (!) 143/63   Pulse (!) 106   Temp 98.3 °F (36.8 °C) (Oral)   Resp 18   Ht 5' 4\" (1.626 m)   Wt 132 lb 14.4 oz (60.3 kg)   SpO2 94%   BMI 22.81 kg/m²     General Appearance: Alert and oriented only to self  Skin: warm and dry  Head: normocephalic and atraumatic  Eyes: pupils equal, round, and reactive to light, extraocular eye movements intact, conjunctivae normal  Neck: neck supple and non tender without mass   Pulmonary/Chest: clear to auscultation bilaterally- no wheezes, rales or rhonchi, normal air movement, no respiratory distress  Cardiovascular: normal rate, normal S1 and S2 and no carotid bruits  Abdomen: soft, non-tender, non-distended, normal bowel sounds, no masses or organomegaly  Extremities: no cyanosis, no clubbing and no edema  Neurologic: no cranial nerve deficit and speech normal        LABS:  Recent Labs     10/14/22  2349      K 3.8   CL 99   CO2 21*   BUN 17   CREATININE 0.6   GLUCOSE 205*   CALCIUM 9.6       Recent Labs     10/14/22  2349   WBC 12.4*   RBC 4.14   HGB 12.1   HCT 36.4   MCV 87.9   MCH 29.2   MCHC 33.2   RDW 13.8      MPV 9.6       No results for input(s): POCGLU in the last 72 hours. Radiology:   CT ABDOMEN PELVIS W IV CONTRAST Additional Contrast? None   Final Result   1. No acute disease. 2. Gastric fluid distension with gastroesophageal reflux observed. No   varicosities or CT evidence for variceal hemorrhage. RECOMMENDATIONS:   Careful clinical correlation and follow up recommended. EKG:       NOTE: This report was transcribed using voice recognition software. Every effort was made to ensure accuracy; however, inadvertent computerized transcription errors may be present.   Electronically signed by Cuco Wallace MD on 10/15/2022 at 4:34 AM

## 2022-10-15 NOTE — PROGRESS NOTES
[facility] [place] to request medication list; spoke with . Will give message to the nurse and will fax as soon as possible. I advised we have requested the list twice.

## 2022-10-15 NOTE — PLAN OF CARE
Presented to the ED with coffee-ground emesis with positive Hemoccult. CT abdomen pelvis on admission showed gastritis/GERD but no bleeding. She was started on IV Protonix and GI was consulted. Her hemoglobin has been steadily dropping. Patient seen Leonardawyatt Garrison in bed she is alert and in no apparent distress. Patient does not appear to be oriented answer some questions inappropriately. Her abdomen is firmly distended. Patient denies abdominal discomfort or pain with palpation. Patient feels as though she is moving her bowels although she is a poor historian. No acute issues overnight. Plan:  GI Bleed: Presents to the ED with multiple bouts of hematemesis. Hgb been stable. CT abdomen did show signs of GERD. Hold home Eliquis. Hgb trending down slowly 12.1>11.3>10.7. Monitor H&H. Continue Protonix twice daily-GI input appreciated  Alzheimer's dementia: Continue Namenda  Type 2 DM: Monitor BS closely while NPO. Continue low-dose SSI and Lantus. Hypoglycemic protocol. Essential HTN: will resume home medications once patient is to take p.o.  BP stable. Hx GERD: Continue Protonix  HLD: Not currently on medication  Hx DVT of the right lower extremity femoral vein: On Eliquis at home currently holding for #1. Monitor.

## 2022-10-15 NOTE — PROGRESS NOTES
Still waiting for med list from [facility] will call again to have med list faxed to us.

## 2022-10-15 NOTE — PROGRESS NOTES
[facility] [place] to ask for patients med list. [facility] nurse said she send the med list with patient. Called ER nurse to ask for the med list ER nurse said she does not have it.  Asked Nurse from [facility] to fax a copy of list to very medication and an accurate medication list.

## 2022-10-15 NOTE — ED PROVIDER NOTES
[address]      Pt Name: Chase Hdz  MRN: [id number]  [place] 1935  Date of evaluation: 10/14/2022      CHIEF COMPLAINT       Chief Complaint   Patient presents with    Nausea    Emesis     Coffee ground emesis at facility        HPI  Chase Hdz is a 81 y.o. female  with PMHx of dementia, type 2 diabetes, DVT who presents with chief complaint of nausea, emesis, possible GI bleed. Patient does have a history of dementia which makes history gathering difficult. However, patient is alert and oriented to person and month and knows that she is in the hospital, but does not know which hospital or what the year is. Patient states that she is not having any nausea or vomiting during examination in the emergency department. She furthermore denies any abdominal pain. She does endorse diarrhea today but cannot recall if there is any blood. Review of Systems   Constitutional:  Negative for appetite change, chills and fever. HENT:  Negative for nosebleeds and trouble swallowing. Respiratory:  Negative for cough, chest tightness and shortness of breath. Cardiovascular:  Negative for chest pain and palpitations. Gastrointestinal:  Positive for diarrhea. Negative for abdominal pain, anal bleeding, blood in stool, constipation, nausea and vomiting. Genitourinary:  Negative for difficulty urinating and hematuria. Neurological:  Negative for syncope. Psychiatric/Behavioral:  Positive for confusion. Physical Exam  Vitals reviewed. Exam conducted with a chaperone present. HENT:      Head: Normocephalic and atraumatic. Right Ear: External ear normal.      Left Ear: External ear normal.   Eyes:      General: No scleral icterus. Extraocular Movements: Extraocular movements intact. Cardiovascular:      Rate and Rhythm: Normal rate and regular rhythm. Heart sounds: No murmur heard.   Pulmonary: Effort: Pulmonary effort is normal.      Breath sounds: Normal breath sounds. Abdominal:      General: Abdomen is flat. Palpations: Abdomen is soft. Tenderness: There is no abdominal tenderness. There is no guarding. Genitourinary:     Rectum: Normal. Guaiac result negative. Musculoskeletal:      Cervical back: Normal range of motion. Right lower leg: Edema (1+ pitting) present. Left lower leg: Edema (trace pitting) present. Comments: Right upper extremity amputation   Skin:     General: Skin is warm and dry. Neurological:      Comments: Patient alert and oriented to person and month. She is intermittently distracted but redirectable during examination. Pleasant affect. SARA Chavira presented with chief complaint of nausea and coffee-ground emesis. Hemoccult was negative. CBC was significant for leukocytosis 12.4. Lipase and CMP unremarkable. Pro time was elevated 15.9. In the ED patient had a recurrence of large-volume coffee-ground emesis but remained hemodynamically stable. Decision was made to admit the patient for further management and this was discussed with admitting provider Dr. Luna Ferrera who was agreeable to this plan. ED Course as of 10/15/22 1844   Sat Oct 15, 2022   6142 Patient had recurrence of large volume of coffee-ground emesis. She remains hemodynamically stable. Will admit for further management.   Will attempt to contact POA as patient is [facility] for consent for further management and possible EGD [PB]      ED Course User Index  [PB] Ari Montenegro, DO       --------------------------------------------- PAST HISTORY ---------------------------------------------  Past Medical History:  has a past medical history of Closed right hip fracture ([facility] [address] Dementia ([facility] [address] DVT (deep venous thrombosis) ([facility] [address] GERD (gastroesophageal reflux disease), Hyperlipidemia, Hypertension, Left scapula fracture, Metatarsal fracture, Moderate protein malnutrition ([facility] [address] Multiple fractures, Radial styloid fracture, and Type II or unspecified type diabetes mellitus without mention of complication, not stated as uncontrolled. 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 Social History:  reports that she has never smoked. Her smokeless tobacco use includes chew. She reports that she does not drink alcohol and does not use drugs. Family History: family history is not on file. The patients home medications have been reviewed. Allergies: Patient has no known allergies.     -------------------------------------------------- RESULTS -------------------------------------------------    LABS:  Results for orders placed or performed during the hospital encounter of 10/14/22   CBC with Auto Differential   Result Value Ref Range    WBC 12.4 (H) 4.5 - 11.5 E9/L    RBC 4.14 3.50 - 5.50 E12/L    Hemoglobin 12.1 11.5 - 15.5 g/dL    Hematocrit 36.4 34.0 - 48.0 %    MCV 87.9 80.0 - 99.9 fL    MCH 29.2 26.0 - 35.0 pg    MCHC 33.2 32.0 - 34.5 %    RDW 13.8 11.5 - 15.0 fL    Platelets 273 682 - 928 E9/L    MPV 9.6 7.0 - 12.0 fL    Neutrophils % 66.0 43.0 - 80.0 %    Immature Granulocytes % 0.4 0.0 - 5.0 %    Lymphocytes % 21.8 20.0 - 42.0 %    Monocytes % 8.2 2.0 - 12.0 %    Eosinophils % 2.7 0.0 - 6.0 %    Basophils % 0.9 0.0 - 2.0 %    Neutrophils Absolute 8.18 (H) 1.80 - 7.30 E9/L    Immature Granulocytes # 0.05 E9/L    Lymphocytes Absolute 2.70 1.50 - 4.00 E9/L    Monocytes Absolute 1.01 (H) 0.10 - 0.95 E9/L    Eosinophils Absolute 0.34 0.05 - 0.50 E9/L    Basophils Absolute 0.11 0.00 - 0.20 E9/L   Comprehensive Metabolic Panel w/ Reflex to MG   Result Value Ref Range    Sodium 134 132 - 146 mmol/L    Potassium reflex Magnesium 3.8 3.5 - 5.0 mmol/L    Chloride 99 98 - 107 mmol/L    CO2 21 (L) 22 - 29 mmol/L    Anion Gap 14 7 - 16 mmol/L    Glucose 205 (H) 74 - 99 mg/dL    BUN 17 6 - 23 mg/dL    Creatinine 0.6 0.5 - 1.0 mg/dL    GFR Non-African American >60 >=60 mL/min/1.73    GFR African American >60     Calcium 9.6 8.6 - 10.2 mg/dL    Total Protein 7.1 6.4 - 8.3 g/dL    Albumin 3.9 3.5 - 5.2 g/dL    Total Bilirubin 0.2 0.0 - 1.2 mg/dL    Alkaline Phosphatase 87 35 - 104 U/L    ALT 13 0 - 32 U/L    AST 19 0 - 31 U/L   Lipase   Result Value Ref Range    Lipase 31 13 - 60 U/L   Protime-INR   Result Value Ref Range    Protime 15.9 (H) 9.3 - 12.4 sec    INR 1.4    Hemoglobin A1c   Result Value Ref Range    Hemoglobin A1C 7.7 (H) 4.0 - 5.6 %   Hemoglobin and Hematocrit   Result Value Ref Range    Hemoglobin 11.3 (L) 11.5 - 15.5 g/dL    Hematocrit 34.9 34.0 - 48.0 %   Hemoglobin and Hematocrit   Result Value Ref Range    Hemoglobin 10.7 (L) 11.5 - 15.5 g/dL    Hematocrit 32.4 (L) 34.0 - 48.0 %   APTT   Result Value Ref Range    aPTT 32.2 24.5 - 35.1 sec   Protime-INR   Result Value Ref Range    Protime 15.1 (H) 9.3 - 12.4 sec    INR 1.3    POCT Glucose   Result Value Ref Range    Meter Glucose 221 (H) 74 - 99 mg/dL   POCT Glucose   Result Value Ref Range    Meter Glucose 124 (H) 74 - 99 mg/dL   POCT Glucose   Result Value Ref Range    Meter Glucose 113 (H) 74 - 99 mg/dL   POCT Glucose   Result Value Ref Range    Meter Glucose 119 (H) 74 - 99 mg/dL       RADIOLOGY:  CT ABDOMEN PELVIS W IV CONTRAST Additional Contrast? None   Final Result   1. No acute disease. 2. Gastric fluid distension with gastroesophageal reflux observed. No   varicosities or CT evidence for variceal hemorrhage. RECOMMENDATIONS:   Careful clinical correlation and follow up recommended.                  ------------------------- NURSING NOTES AND VITALS REVIEWED ---------------------------  Date / Time Roomed:  10/14/2022 11:14 PM  ED Bed Assignment:  [id number]    The nursing notes within the ED encounter and vital signs as below have been reviewed. Patient Vitals for the past 24 hrs:   BP Temp Temp src Pulse Resp SpO2 Height Weight   10/15/22 0706 119/68 98.6 °F (37 °C) Oral 95 18 98 % -- --   10/15/22 0400 (!) 143/63 98.3 °F (36.8 °C) Oral (!) 106 18 94 % 5' 4\" (1.626 m) 132 lb 14.4 oz (60.3 kg)   10/14/22 2323 122/71 98.6 °F (37 °C) Oral 99 15 98 % 5' 4\" (1.626 m) 160 lb (72.6 kg)       Oxygen Saturation Interpretation: Normal    ------------------------------------------ PROGRESS NOTES ------------------------------------------  Re-evaluation(s):   Patients symptoms show no change  Repeat physical examination is not changed    Counseling:  I have spoken with the patient and discussed todays results, in addition to providing specific details for the plan of care and counseling regarding the diagnosis and prognosis. Their questions are answered at this time and they are agreeable with the plan of admission.    --------------------------------- ADDITIONAL PROVIDER NOTES ---------------------------------  Consultations:  Spoke with Dr. Mervat Paul. Discussed case. They will admit the patient. This patient's ED course included: a personal history and physicial examination, re-evaluation prior to disposition, multiple bedside re-evaluations, IV medications, and cardiac monitoring    This patient has remained hemodynamically stable during their ED course. Diagnosis:  1.  Hematemesis, unspecified whether nausea present        Disposition:  Patient's disposition: Admit to med/surg floor  Patient's condition is Stable         Jesus Mena MD  Resident  10/15/22 1613

## 2022-10-15 NOTE — PROGRESS NOTES
Dr. LEYVA [facility] out of town until Monday. Consult will be placed to Dr. Miley Salcido at this time.  Electronically signed by Elridge Cushing, RN on 10/15/2022 at 10:18 AM

## 2022-10-16 LAB
ANION GAP SERPL CALCULATED.3IONS-SCNC: 6 MMOL/L (ref 7–16)
BUN BLDV-MCNC: 11 MG/DL (ref 6–23)
CALCIUM SERPL-MCNC: 8.1 MG/DL (ref 8.6–10.2)
CHLORIDE BLD-SCNC: 110 MMOL/L (ref 98–107)
CO2: 26 MMOL/L (ref 22–29)
CREAT SERPL-MCNC: 0.6 MG/DL (ref 0.5–1)
GFR AFRICAN AMERICAN: >60
GFR NON-AFRICAN AMERICAN: >60 ML/MIN/1.73
GLUCOSE BLD-MCNC: 106 MG/DL (ref 74–99)
HCT VFR BLD CALC: 30.1 % (ref 34–48)
HCT VFR BLD CALC: 32.1 % (ref 34–48)
HEMOGLOBIN: 10.4 G/DL (ref 11.5–15.5)
HEMOGLOBIN: 9.9 G/DL (ref 11.5–15.5)
METER GLUCOSE: 107 MG/DL (ref 74–99)
METER GLUCOSE: 110 MG/DL (ref 74–99)
METER GLUCOSE: 130 MG/DL (ref 74–99)
METER GLUCOSE: 130 MG/DL (ref 74–99)
METER GLUCOSE: 183 MG/DL (ref 74–99)
METER GLUCOSE: 95 MG/DL (ref 74–99)
POTASSIUM SERPL-SCNC: 3.5 MMOL/L (ref 3.5–5)
SODIUM BLD-SCNC: 142 MMOL/L (ref 132–146)

## 2022-10-16 PROCEDURE — A4216 STERILE WATER/SALINE, 10 ML: HCPCS | Performed by: INTERNAL MEDICINE

## 2022-10-16 PROCEDURE — 85018 HEMOGLOBIN: CPT

## 2022-10-16 PROCEDURE — 80048 BASIC METABOLIC PNL TOTAL CA: CPT

## 2022-10-16 PROCEDURE — 6370000000 HC RX 637 (ALT 250 FOR IP): Performed by: NURSE PRACTITIONER

## 2022-10-16 PROCEDURE — 6370000000 HC RX 637 (ALT 250 FOR IP): Performed by: INTERNAL MEDICINE

## 2022-10-16 PROCEDURE — 6360000002 HC RX W HCPCS: Performed by: INTERNAL MEDICINE

## 2022-10-16 PROCEDURE — 99232 SBSQ HOSP IP/OBS MODERATE 35: CPT | Performed by: NURSE PRACTITIONER

## 2022-10-16 PROCEDURE — 85014 HEMATOCRIT: CPT

## 2022-10-16 PROCEDURE — 2580000003 HC RX 258: Performed by: INTERNAL MEDICINE

## 2022-10-16 PROCEDURE — C9113 INJ PANTOPRAZOLE SODIUM, VIA: HCPCS | Performed by: INTERNAL MEDICINE

## 2022-10-16 PROCEDURE — 1200000000 HC SEMI PRIVATE

## 2022-10-16 PROCEDURE — 36415 COLL VENOUS BLD VENIPUNCTURE: CPT

## 2022-10-16 PROCEDURE — 82962 GLUCOSE BLOOD TEST: CPT

## 2022-10-16 RX ORDER — INSULIN LISPRO 100 [IU]/ML
0-4 INJECTION, SOLUTION INTRAVENOUS; SUBCUTANEOUS
Status: DISCONTINUED | OUTPATIENT
Start: 2022-10-16 | End: 2022-10-18 | Stop reason: HOSPADM

## 2022-10-16 RX ORDER — AMLODIPINE BESYLATE 10 MG/1
10 TABLET ORAL DAILY
Status: DISCONTINUED | OUTPATIENT
Start: 2022-10-16 | End: 2022-10-18 | Stop reason: HOSPADM

## 2022-10-16 RX ORDER — M-VIT,TX,IRON,MINS/CALC/FOLIC 27MG-0.4MG
1 TABLET ORAL DAILY
COMMUNITY

## 2022-10-16 RX ORDER — INSULIN LISPRO 100 [IU]/ML
0-4 INJECTION, SOLUTION INTRAVENOUS; SUBCUTANEOUS NIGHTLY
Status: DISCONTINUED | OUTPATIENT
Start: 2022-10-16 | End: 2022-10-18 | Stop reason: HOSPADM

## 2022-10-16 RX ORDER — FAMOTIDINE 20 MG/1
20 TABLET, FILM COATED ORAL 2 TIMES DAILY
Status: ON HOLD | COMMUNITY
End: 2022-10-18 | Stop reason: HOSPADM

## 2022-10-16 RX ORDER — PRAVASTATIN SODIUM 20 MG
40 TABLET ORAL DAILY
Status: DISCONTINUED | OUTPATIENT
Start: 2022-10-16 | End: 2022-10-18 | Stop reason: HOSPADM

## 2022-10-16 RX ORDER — INSULIN DETEMIR 100 [IU]/ML
10 INJECTION, SOLUTION SUBCUTANEOUS NIGHTLY
Status: ON HOLD | COMMUNITY
End: 2022-10-18 | Stop reason: HOSPADM

## 2022-10-16 RX ADMIN — SODIUM CHLORIDE, PRESERVATIVE FREE 40 MG: 5 INJECTION INTRAVENOUS at 21:06

## 2022-10-16 RX ADMIN — AMLODIPINE BESYLATE 10 MG: 10 TABLET ORAL at 16:23

## 2022-10-16 RX ADMIN — LORAZEPAM 0.5 MG: 2 INJECTION INTRAMUSCULAR; INTRAVENOUS at 21:04

## 2022-10-16 RX ADMIN — SODIUM CHLORIDE, PRESERVATIVE FREE 10 ML: 5 INJECTION INTRAVENOUS at 21:03

## 2022-10-16 RX ADMIN — PRAVASTATIN SODIUM 40 MG: 20 TABLET ORAL at 16:23

## 2022-10-16 RX ADMIN — SODIUM CHLORIDE: 9 INJECTION, SOLUTION INTRAVENOUS at 20:36

## 2022-10-16 RX ADMIN — MEMANTINE HYDROCHLORIDE 10 MG: 10 TABLET, FILM COATED ORAL at 09:49

## 2022-10-16 RX ADMIN — SODIUM CHLORIDE, PRESERVATIVE FREE 40 MG: 5 INJECTION INTRAVENOUS at 09:49

## 2022-10-16 RX ADMIN — PETROLATUM: 420 OINTMENT TOPICAL at 09:51

## 2022-10-16 RX ADMIN — SODIUM CHLORIDE: 9 INJECTION, SOLUTION INTRAVENOUS at 06:53

## 2022-10-16 RX ADMIN — MEMANTINE HYDROCHLORIDE 10 MG: 10 TABLET, FILM COATED ORAL at 21:06

## 2022-10-16 RX ADMIN — INSULIN GLARGINE 10 UNITS: 100 INJECTION, SOLUTION SUBCUTANEOUS at 21:06

## 2022-10-16 NOTE — PROGRESS NOTES
[facility] [place] to again attempt to request patient's medication list. I was advised it will be faxed as soon as possible. Fax number given.

## 2022-10-16 NOTE — PROGRESS NOTES
Called network help line for phone consent for EGD. They advised that they will have guardian Arnaud Rolling call back.

## 2022-10-16 NOTE — PROGRESS NOTES
[facility] Progress Note    Admitting Date and Time: 10/14/2022 11:14 PM  Admit Dx: Hematemesis [K92.0]  Hematemesis, unspecified whether nausea present [K92.0]  Upper GI bleed [K92.2]    Subjective:  Patient is being followed for Hematemesis [K92.0]  Hematemesis, unspecified whether nausea present [K92.0]  Upper GI bleed [K92.2]     Patient is seen laying in bed. She is alert and in no distress. She is denying abdominal pain at this time. Ok to restart diet today. No acute issues overnight. ROS: denies fever, chills, cp, sob, n/v, HA unless stated above.       insulin glargine  10 Units SubCUTAneous Nightly    memantine  10 mg Oral BID    sodium chloride flush  5-40 mL IntraVENous 2 times per day    insulin lispro  0-4 Units SubCUTAneous Q4H    pantoprazole (PROTONIX) 40 mg injection  40 mg IntraVENous BID     sodium chloride flush, 5-40 mL, PRN  sodium chloride, , PRN  ondansetron, 4 mg, Q8H PRN   Or  ondansetron, 4 mg, Q6H PRN  acetaminophen, 650 mg, Q6H PRN   Or  acetaminophen, 650 mg, Q6H PRN  morphine, 2 mg, Q3H PRN  LORazepam, 0.5 mg, Q6H PRN  glucose, 4 tablet, PRN  dextrose bolus, 125 mL, PRN   Or  dextrose bolus, 250 mL, PRN  glucagon (rDNA), 1 mg, PRN  dextrose, , Continuous PRN  glucose, 4 tablet, PRN  dextrose bolus, 125 mL, PRN   Or  dextrose bolus, 250 mL, PRN  glucagon (rDNA), 1 mg, PRN  dextrose, , Continuous PRN  white petrolatum, , BID PRN         Objective:    BP (!) 145/69   Pulse 87   Temp 97.7 °F (36.5 °C) (Oral)   Resp 18   Ht 5' 4\" (1.626 m)   Wt 132 lb (59.9 kg)   SpO2 93%   BMI 22.66 kg/m²     General Appearance: alert and oriented to person, place and time and in no acute distress  Skin: warm and dry  Head: normocephalic and atraumatic  Eyes: pupils equal, round, and reactive to light, extraocular eye movements intact, conjunctivae normal  Neck: neck supple and non tender without mass   Pulmonary/Chest: clear to auscultation bilaterally- no wheezes, rales or rhonchi, normal air movement, no respiratory distress  Cardiovascular: normal rate, normal S1 and S2 and no carotid bruits  Abdomen: soft, non-tender, non-distended, normal bowel sounds, no masses or organomegaly  Extremities: no cyanosis, no clubbing and no edema  Neurologic: no cranial nerve deficit and speech normal        Recent Labs     10/14/22  2349 10/16/22  0520    142   K 3.8 3.5   CL 99 110*   CO2 21* 26   BUN 17 11   CREATININE 0.6 0.6   GLUCOSE 205* 106*   CALCIUM 9.6 8.1*       Recent Labs     10/14/22  2349 10/15/22  0847 10/15/22  1445 10/15/22  2300 10/16/22  0520   WBC 12.4*  --   --   --   --    RBC 4.14  --   --   --   --    HGB 12.1   < > 10.7* 10.6* 10.4*   HCT 36.4   < > 32.4* 32.4* 32.1*   MCV 87.9  --   --   --   --    MCH 29.2  --   --   --   --    MCHC 33.2  --   --   --   --    RDW 13.8  --   --   --   --      --   --   --   --    MPV 9.6  --   --   --   --     < > = values in this interval not displayed. Assessment:    Principal Problem:    Hematemesis  Active Problems:    Upper GI bleed  Resolved Problems:    * No resolved hospital problems. *      Plan:    GI Bleed: Presents to the ED with multiple bouts of hematemesis. CT abdomen did show signs of GERD. Hold home Eliquis. Hgb trending down slowly 12.1>11.3>10.7>10.4. Monitor H&H. Continue Protonix twice daily. EGD tomorrow. Keep NPO after MN-GI input appreciated  Alzheimer's dementia: Continue Namenda  Type 2 DM:  Continue low-dose SSI and Lantus. Hypoglycemic protocol. Resumed diet today- carb control. NPO at [facility] for procedure. Essential HTN: Restarted Norvasc  Hx GERD: Continue Protonix  HLD: continue pravastatin   Hx DVT of the right lower extremity femoral vein: On Eliquis at home currently holding for #1. Monitor. In review of EMR, valuation, management, and diagnosis. Care plan has been discussed with attending.   Time spent 25 minutes    NOTE: This report was transcribed using voice recognition software. Every effort was made to ensure accuracy; however, inadvertent computerized transcription errors may be present.   Electronically signed by TOOTIE Escalera CNP on 10/16/2022 at 10:23 AM

## 2022-10-16 NOTE — CONSULTS
Gastroenterology Consult Note   Gracie SOMMERS NP-C with Michael Borjas M.D. Consult Note        Date of Service: 10/16/2022  Reason for Consult: upper gi bleed  Requesting Physician: Jono Gonzalez MD    CHIEF COMPLAINT:  coffee ground emesis per facility    History Obtained From:  electronic medical record    HISTORY OF PRESENT ILLNESS:       Melissa Beverly is a 81 y.o. female with significant past medical history of right hip fracture, dementia, DVT, GERD, HLD, HTN, protein malnutrition, multiple fractures, and DM2 admitted via ED for coffee ground emesis. Pt is non-verbal at this time. Per BS nurse patient will conversate at times. HPI obtained from review of EMR. Patient presents to ER from SNF for coffee ground emesis. No scopes recalled per the patient. Admission labs CO2 21, , , WBC 12.4. CT ABD: No acute disease. 2. Gastric fluid distension with gastroesophageal reflux observed. No varicosities or CT evidence for variceal hemorrhage. Consultation for upper gi bleed. Currently, pt seen in bed, awake but eyes remain closed. Per BS nurse patient denies any N/V. No further emesis since admission. Labs today chloride 110, anion gap 6, , calcium 8.1, H&H 10.4 & 32.   Past Medical History:        Diagnosis Date    Closed right hip fracture ([address] 7/22/2015    Dementia ([address]     DVT (deep venous thrombosis) ([address] 11/24/2016    GERD (gastroesophageal reflux disease)     Hyperlipidemia     Hypertension     Left scapula fracture 7/22/2015    Metatarsal fracture 7/22/2015    Moderate protein malnutrition ([address] 7/16/2015    Multiple fractures     legs, arms, ribs, sternum, facial    Radial styloid fracture 7/22/2015    Type II or unspecified type diabetes mellitus without mention of complication, not stated as uncontrolled      Past Surgical History:        Procedure Laterality Date    ABDOMEN SURGERY      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 Jose Thomas Right 11/28/2016    REMOVAL OLD HARDWARE RIGHT HIP;TOTAL RIGHT HIP ARTHROPLASTY    VENA CAVA FILTER PLACEMENT  12/01/2016    Aby Lozoya    VENA CAVA FILTER PLACEMENT Right 12/01/2016     Current Medications:    Current Facility-Administered Medications: insulin glargine (LANTUS) injection vial 10 Units, 10 Units, SubCUTAneous, Nightly  memantine (NAMENDA) tablet 10 mg, 10 mg, Oral, BID  sodium chloride flush 0.9 % injection 5-40 mL, 5-40 mL, IntraVENous, 2 times per day  sodium chloride flush 0.9 % injection 5-40 mL, 5-40 mL, IntraVENous, PRN  0.9 % sodium chloride infusion, , IntraVENous, PRN  ondansetron (ZOFRAN-ODT) disintegrating tablet 4 mg, 4 mg, Oral, Q8H PRN **OR** ondansetron (ZOFRAN) injection 4 mg, 4 mg, IntraVENous, Q6H PRN  acetaminophen (TYLENOL) tablet 650 mg, 650 mg, Oral, Q6H PRN **OR** acetaminophen (TYLENOL) suppository 650 mg, 650 mg, Rectal, Q6H PRN  0.9 % sodium chloride infusion, , IntraVENous, Continuous  morphine (PF) injection 2 mg, 2 mg, IntraVENous, Q3H PRN  LORazepam (ATIVAN) injection 0.5 mg, 0.5 mg, IntraVENous, Q6H PRN  glucose chewable tablet 16 g, 4 tablet, Oral, PRN  dextrose bolus 10% 125 mL, 125 mL, IntraVENous, PRN **OR** dextrose bolus 10% 250 mL, 250 mL, IntraVENous, PRN  glucagon (rDNA) injection 1 mg, 1 mg, SubCUTAneous, PRN  dextrose 10 % infusion, , IntraVENous, Continuous PRN  glucose chewable tablet 16 g, 4 tablet, Oral, PRN  dextrose bolus 10% 125 mL, 125 mL, IntraVENous, PRN **OR** dextrose bolus 10% 250 mL, 250 mL, IntraVENous, PRN  glucagon (rDNA) injection 1 mg, 1 mg, SubCUTAneous, PRN  dextrose 10 % infusion, , IntraVENous, Continuous PRN  insulin lispro (HUMALOG) injection vial 0-4 Units, 0-4 Units, SubCUTAneous, Q4H  pantoprazole (PROTONIX) 40 mg in sodium chloride (PF) 10 mL injection, 40 mg, IntraVENous, BID  white petrolatum ointment, , Topical, BID PRN    Allergies:  Patient has no known allergies.     Social History:  No ETOH, tobacco, or drug use noted per EMR      Family History:   History reviewed. No pertinent family history. Unable to update. Patient with dementia    REVIEW OF SYSTEMS:    Aside from what was mentioned in the PMH and HPI, essentially unremarkable, all others negative. PHYSICAL EXAM:      Vitals:    BP (!) 145/69   Pulse 87   Temp 97.7 °F (36.5 °C) (Oral)   Resp 18   Ht 5' 4\" (1.626 m)   Wt 132 lb (59.9 kg)   SpO2 93%   BMI 22.66 kg/m²       CONSTITUTIONAL:  awake, eyes-closed, cooperative, no apparent distress, and appears older than stated age  EYES:  pupils equal, round and reactive to light, sclera anicteric and conjunctiva normal  ENT:  normocephalic, oral pharynx with moist mucous membranes  LUNGS:  clear to auscultation bilaterally.   CARDIOVASCULAR:  Normal apical impulse, regular rate and rhythm, no murmur noted; 2+ pulses; 1-2+ BLE  ABDOMEN:  No scars, normal bowel sounds, soft, non-distended, non-tender  NEUROLOGIC:  Mental Status Exam:  Level of Alertness:   awake  Orientation:  JOEY, dementia  SKIN:  normal skin color, texture, turgor  RUE amputee above elbow    DATA:    CBC with Differential:    Lab Results   Component Value Date/Time    WBC 12.4 10/14/2022 11:49 PM    RBC 4.14 10/14/2022 11:49 PM    HGB 10.4 10/16/2022 05:20 AM    HCT 32.1 10/16/2022 05:20 AM     10/14/2022 11:49 PM    MCV 87.9 10/14/2022 11:49 PM    MCH 29.2 10/14/2022 11:49 PM    MCHC 33.2 10/14/2022 11:49 PM    RDW 13.8 10/14/2022 11:49 PM    NRBC 2 05/08/2015 04:35 AM    BANDSPCT 1 07/15/2015 05:10 AM    LYMPHOPCT 21.8 10/14/2022 11:49 PM    MONOPCT 8.2 10/14/2022 11:49 PM    BASOPCT 0.9 10/14/2022 11:49 PM    MONOSABS 1.01 10/14/2022 11:49 PM    LYMPHSABS 2.70 10/14/2022 11:49 PM    EOSABS 0.34 10/14/2022 11:49 PM    BASOSABS 0.11 10/14/2022 11:49 PM     CMP:    Lab Results   Component Value Date/Time     10/16/2022 05:20 AM    K 3.5 10/16/2022 05:20 AM    K 3.8 10/14/2022 11:49 PM     10/16/2022 05:20 AM    CO2 26 10/16/2022 05:20 AM    BUN 11 10/16/2022 05:20 AM    CREATININE 0.6 10/16/2022 05:20 AM    GFRAA >60 10/16/2022 05:20 AM    LABGLOM >60 10/16/2022 05:20 AM    GLUCOSE 106 10/16/2022 05:20 AM    PROT 7.1 10/14/2022 11:49 PM    LABALBU 3.9 10/14/2022 11:49 PM    CALCIUM 8.1 10/16/2022 05:20 AM    BILITOT 0.2 10/14/2022 11:49 PM    ALKPHOS 87 10/14/2022 11:49 PM    AST 19 10/14/2022 11:49 PM    ALT 13 10/14/2022 11:49 PM     Hepatic Function Panel:    Lab Results   Component Value Date/Time    ALKPHOS 87 10/14/2022 11:49 PM    ALT 13 10/14/2022 11:49 PM    AST 19 10/14/2022 11:49 PM    PROT 7.1 10/14/2022 11:49 PM    BILITOT 0.2 10/14/2022 11:49 PM    BILIDIR 2.1 05/15/2015 08:25 AM    LABALBU 3.9 10/14/2022 11:49 PM     PT/INR:    Lab Results   Component Value Date/Time    PROTIME 15.1 10/15/2022 02:45 PM    INR 1.3 10/15/2022 02:45 PM     PTT:    Lab Results   Component Value Date/Time    APTT 32.2 10/15/2022 02:45 PM   [APTT}  Last 3 Troponin:    Lab Results   Component Value Date/Time    TROPONINI <0.01 05/13/2015 02:15 PM    TROPONINI 0.06 05/08/2015 02:05 AM    TROPONINI 0.08 05/07/2015 09:05 PM       IRON:    Lab Results   Component Value Date/Time    IRON 133 10/15/2022 02:45 PM     Iron Saturation:    Lab Results   Component Value Date/Time    LABIRON 50 10/15/2022 02:45 PM     TIBC:    Lab Results   Component Value Date/Time    TIBC 265 10/15/2022 02:45 PM       CT ABDOMEN PELVIS W IV CONTRAST Additional Contrast? None    Result Date: 10/15/2022  EXAMINATION: CT OF THE ABDOMEN AND PELVIS WITH CONTRAST 10/15/2022 1:48 am TECHNIQUE: CT of the abdomen and pelvis was performed with the administration of intravenous contrast. Multiplanar reformatted images are provided for review. Automated exposure control, iterative reconstruction, and/or weight based adjustment of the mA/kV was utilized to reduce the radiation dose to as low as reasonably achievable. COMPARISON: None.  HISTORY: ORDERING SYSTEM PROVIDED HISTORY: Vomiting/Possible Hematemesis TECHNOLOGIST PROVIDED HISTORY: Additional Contrast?->None Reason for exam:->Vomiting/Possible Hematemesis FINDINGS: Lower Chest: Gastroesophageal reflux. Small fatty left posterior diaphragmatic hernia. Cardiomegaly. Atelectasis. Organs: Cholelithiasis. Liver, gallbladder, bilateral adrenal glands, bilateral kidneys, spleen and pancreas are otherwise normal. GI/Bowel: No ileus or obstruction. Gastric fluid distension with gastroesophageal reflux observed. No varicosities or evidence for variceal hemorrhage. Pelvis: No adnexal mass or fluid detected. Limitation secondary to right hip arthroplasty. Peritoneum/Retroperitoneum: IVC filter in position. Bones/Soft Tissues: Osteopenia. Chronic L2 and L4 compression deformities. 1. No acute disease. 2. Gastric fluid distension with gastroesophageal reflux observed. No varicosities or CT evidence for variceal hemorrhage. RECOMMENDATIONS: Careful clinical correlation and follow up recommended. IMPRESSION:    Coffee ground emesis- per facility  Anemia, normocytic  Dementia  HTN  DM2  HX of DVT of RLE- on Eliquis    RECOMMENDATIONS:      Bedside swallow eval  Diet as tolerated for today  NPO P MN  EGD tomorrow with Dr. Miguel Barth. Procedure details for EGD discussed in detail. Complications including but not limited to, perforation, bleeding and infection were discussed in great detail. Risks, benefits, and alternatives explained. Pt has understood the information and has agreed to proceed. Occult stool x1  Monitor stools  Continue Protonix as ordered  Supportive care  Trend H&H  Will follow    Thank you very much for your consultation. We will follow closely with you.     Discussed with Dr. Shelia No developed by Dr. Cristian Onofre NP-ANKIT 10/16/2022 9:00 AM for Dr. Miguel Barth

## 2022-10-17 ENCOUNTER — ANESTHESIA (OUTPATIENT)
Dept: ENDOSCOPY | Age: 87
DRG: 378 | End: 2022-10-17
Payer: MEDICARE

## 2022-10-17 ENCOUNTER — ANESTHESIA EVENT (OUTPATIENT)
Dept: ENDOSCOPY | Age: 87
DRG: 378 | End: 2022-10-17
Payer: MEDICARE

## 2022-10-17 PROBLEM — E87.6 HYPOKALEMIA: Status: ACTIVE | Noted: 2022-10-17

## 2022-10-17 LAB
ANION GAP SERPL CALCULATED.3IONS-SCNC: 9 MMOL/L (ref 7–16)
BUN BLDV-MCNC: 7 MG/DL (ref 6–23)
CALCIUM SERPL-MCNC: 8.3 MG/DL (ref 8.6–10.2)
CHLORIDE BLD-SCNC: 109 MMOL/L (ref 98–107)
CO2: 24 MMOL/L (ref 22–29)
CREAT SERPL-MCNC: 0.6 MG/DL (ref 0.5–1)
GFR AFRICAN AMERICAN: >60
GFR NON-AFRICAN AMERICAN: >60 ML/MIN/1.73
GLUCOSE BLD-MCNC: 126 MG/DL (ref 74–99)
HCT VFR BLD CALC: 31.1 % (ref 34–48)
HCT VFR BLD CALC: 33.3 % (ref 34–48)
HEMOGLOBIN: 10.1 G/DL (ref 11.5–15.5)
HEMOGLOBIN: 11.3 G/DL (ref 11.5–15.5)
INR BLD: 1.2
METER GLUCOSE: 111 MG/DL (ref 74–99)
METER GLUCOSE: 113 MG/DL (ref 74–99)
METER GLUCOSE: 136 MG/DL (ref 74–99)
METER GLUCOSE: 143 MG/DL (ref 74–99)
POTASSIUM SERPL-SCNC: 3.3 MMOL/L (ref 3.5–5)
PROTHROMBIN TIME: 12.8 SEC (ref 9.3–12.4)
SODIUM BLD-SCNC: 142 MMOL/L (ref 132–146)

## 2022-10-17 PROCEDURE — 87081 CULTURE SCREEN ONLY: CPT

## 2022-10-17 PROCEDURE — 6360000002 HC RX W HCPCS: Performed by: NURSE ANESTHETIST, CERTIFIED REGISTERED

## 2022-10-17 PROCEDURE — 2709999900 HC NON-CHARGEABLE SUPPLY: Performed by: INTERNAL MEDICINE

## 2022-10-17 PROCEDURE — 85018 HEMOGLOBIN: CPT

## 2022-10-17 PROCEDURE — 7100000011 HC PHASE II RECOVERY - ADDTL 15 MIN: Performed by: INTERNAL MEDICINE

## 2022-10-17 PROCEDURE — 82962 GLUCOSE BLOOD TEST: CPT

## 2022-10-17 PROCEDURE — 3700000000 HC ANESTHESIA ATTENDED CARE: Performed by: INTERNAL MEDICINE

## 2022-10-17 PROCEDURE — 7100000010 HC PHASE II RECOVERY - FIRST 15 MIN: Performed by: INTERNAL MEDICINE

## 2022-10-17 PROCEDURE — 2580000003 HC RX 258: Performed by: NURSE ANESTHETIST, CERTIFIED REGISTERED

## 2022-10-17 PROCEDURE — 0DB98ZX EXCISION OF DUODENUM, VIA NATURAL OR ARTIFICIAL OPENING ENDOSCOPIC, DIAGNOSTIC: ICD-10-PCS | Performed by: INTERNAL MEDICINE

## 2022-10-17 PROCEDURE — 6370000000 HC RX 637 (ALT 250 FOR IP): Performed by: INTERNAL MEDICINE

## 2022-10-17 PROCEDURE — 99232 SBSQ HOSP IP/OBS MODERATE 35: CPT | Performed by: NURSE PRACTITIONER

## 2022-10-17 PROCEDURE — C9113 INJ PANTOPRAZOLE SODIUM, VIA: HCPCS | Performed by: INTERNAL MEDICINE

## 2022-10-17 PROCEDURE — 3609012400 HC EGD TRANSORAL BIOPSY SINGLE/MULTIPLE: Performed by: INTERNAL MEDICINE

## 2022-10-17 PROCEDURE — 2580000003 HC RX 258: Performed by: INTERNAL MEDICINE

## 2022-10-17 PROCEDURE — A4216 STERILE WATER/SALINE, 10 ML: HCPCS | Performed by: INTERNAL MEDICINE

## 2022-10-17 PROCEDURE — 6370000000 HC RX 637 (ALT 250 FOR IP): Performed by: FAMILY MEDICINE

## 2022-10-17 PROCEDURE — 36415 COLL VENOUS BLD VENIPUNCTURE: CPT

## 2022-10-17 PROCEDURE — 1200000000 HC SEMI PRIVATE

## 2022-10-17 PROCEDURE — 3700000001 HC ADD 15 MINUTES (ANESTHESIA): Performed by: INTERNAL MEDICINE

## 2022-10-17 PROCEDURE — 80048 BASIC METABOLIC PNL TOTAL CA: CPT

## 2022-10-17 PROCEDURE — 88305 TISSUE EXAM BY PATHOLOGIST: CPT

## 2022-10-17 PROCEDURE — 85610 PROTHROMBIN TIME: CPT

## 2022-10-17 PROCEDURE — 85014 HEMATOCRIT: CPT

## 2022-10-17 PROCEDURE — 0DB68ZX EXCISION OF STOMACH, VIA NATURAL OR ARTIFICIAL OPENING ENDOSCOPIC, DIAGNOSTIC: ICD-10-PCS | Performed by: INTERNAL MEDICINE

## 2022-10-17 PROCEDURE — 6360000002 HC RX W HCPCS: Performed by: INTERNAL MEDICINE

## 2022-10-17 RX ORDER — SODIUM CHLORIDE 9 MG/ML
INJECTION, SOLUTION INTRAVENOUS CONTINUOUS PRN
Status: DISCONTINUED | OUTPATIENT
Start: 2022-10-17 | End: 2022-10-17 | Stop reason: SDUPTHER

## 2022-10-17 RX ORDER — PROPOFOL 10 MG/ML
INJECTION, EMULSION INTRAVENOUS PRN
Status: DISCONTINUED | OUTPATIENT
Start: 2022-10-17 | End: 2022-10-17 | Stop reason: SDUPTHER

## 2022-10-17 RX ORDER — POTASSIUM CHLORIDE 20 MEQ/1
40 TABLET, EXTENDED RELEASE ORAL ONCE
Status: COMPLETED | OUTPATIENT
Start: 2022-10-17 | End: 2022-10-17

## 2022-10-17 RX ADMIN — INSULIN GLARGINE 10 UNITS: 100 INJECTION, SOLUTION SUBCUTANEOUS at 20:23

## 2022-10-17 RX ADMIN — SODIUM CHLORIDE, PRESERVATIVE FREE 40 MG: 5 INJECTION INTRAVENOUS at 08:32

## 2022-10-17 RX ADMIN — SODIUM CHLORIDE, PRESERVATIVE FREE 40 MG: 5 INJECTION INTRAVENOUS at 20:22

## 2022-10-17 RX ADMIN — PROPOFOL 100 MG: 10 INJECTION, EMULSION INTRAVENOUS at 15:37

## 2022-10-17 RX ADMIN — SODIUM CHLORIDE: 9 INJECTION, SOLUTION INTRAVENOUS at 17:02

## 2022-10-17 RX ADMIN — SODIUM CHLORIDE: 9 INJECTION, SOLUTION INTRAVENOUS at 08:32

## 2022-10-17 RX ADMIN — SODIUM CHLORIDE: 9 INJECTION, SOLUTION INTRAVENOUS at 15:31

## 2022-10-17 RX ADMIN — POTASSIUM CHLORIDE 40 MEQ: 1500 TABLET, EXTENDED RELEASE ORAL at 11:18

## 2022-10-17 RX ADMIN — SODIUM CHLORIDE, PRESERVATIVE FREE 10 ML: 5 INJECTION INTRAVENOUS at 20:22

## 2022-10-17 RX ADMIN — MEMANTINE HYDROCHLORIDE 10 MG: 10 TABLET, FILM COATED ORAL at 20:22

## 2022-10-17 NOTE — PLAN OF CARE
Problem: Safety - Adult  Goal: Free from fall injury  10/17/2022 1219 by Aditya Serrano RN  Outcome: Progressing  10/17/2022 0030 by Geovany Mauricio RN  Outcome: Progressing     Problem: Skin/Tissue Integrity  Goal: Absence of new skin breakdown  Description: 1. Monitor for areas of redness and/or skin breakdown  2. Assess vascular access sites hourly  3. Every 4-6 hours minimum:  Change oxygen saturation probe site  4. Every 4-6 hours:  If on nasal continuous positive airway pressure, respiratory therapy assess nares and determine need for appliance change or resting period.   10/17/2022 1219 by Aditya Serrano RN  Outcome: Progressing  10/17/2022 0030 by Geovany Mauricio RN  Outcome: Progressing     Problem: Chronic Conditions and Co-morbidities  Goal: Patient's chronic conditions and co-morbidity symptoms are monitored and maintained or improved  10/17/2022 1219 by Aditya Serrano RN  Outcome: Progressing  10/17/2022 0030 by Geovany Mauricio RN  Outcome: Progressing

## 2022-10-17 NOTE — BRIEF OP NOTE
Brief Postoperative Note    Kendall Ruff  YOB: 1935  [id number]    Procedure: EGD with biopsy    Anesthesia: [facility]    Surgeon:  Ander Laurent MD    Findings:       Esophagus:  NORMAL      Stomach:  Gastritis bx done to rule out H. Pylori      Duodenum:  Normal    Bx. done to rule out sprue       Complications: None      Estimated blood loss: none      Jacque Davila MD

## 2022-10-17 NOTE — PROGRESS NOTES
Clinical manager notified of EGD tomorrow.   Electronically signed by Anthony Knox RN on 10/16/2022 at 8:25 PM

## 2022-10-17 NOTE — DISCHARGE INSTR - COC
Continuity of Care Form    Patient Name: Sheyla Ceballos   :  1935  MRN:  [id number]    Admit date:  10/14/2022  Discharge date:  ***    Code Status Order: DNR-CCA   Advance Directives:   [facility] Documentation       Date/Time Healthcare Directive Type of Healthcare Directive Copy in [facility] [address] Agent's Name Healthcare Agent's Phone Number    10/17/22 4836 Yes, patient has an advance directive for healthcare treatment Health care treatment directive;Durable power of  for health care -- Legal Guardian [facility] [phone]            Admitting Physician:  Tripp Hoff MD  PCP: Yair Yoo MD    Discharging Nurse: [facility] Unit/Room#: 3692/2730-B  Discharging Unit Phone Number: [phone]    Emergency Contact:   Extended Emergency Contact Information  Primary Emergency Contact: Ileana Ruff  [facility] [address] , [address] [place] [address]  Home Phone: [phone]  Mobile Phone: [phone]  Relation: Legal Guardian  Preferred language: English   needed? No  Secondary Emergency Contact: [address] Phone: [phone]  Mobile Phone: [phone]  Relation: Other  Preferred language: English   needed?  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 Brennan Osborn Right 2016    REMOVAL OLD HARDWARE RIGHT HIP;TOTAL RIGHT HIP ARTHROPLASTY    VENA CAVA FILTER PLACEMENT  2016    Aby Lozoya    VENA CAVA FILTER PLACEMENT Right 2016       Immunization History:   Immunization History   Administered Date(s) Administered    Pneumococcal Polysaccharide ([id number]) 2015    Td, unspecified formulation 2015       Active Problems:  Patient Active Problem List   Diagnosis Code Hyperlipidemia E78.5    Dementia (HCC) F03.90    GERD (gastroesophageal reflux disease) K21.9    D.M. 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    Basal cell carcinoma (BCC) of skin of left lower extremity including hip C44.719    Cellulitis L03.90    Skin cancer C44.90    Hematemesis K92.0    Upper GI bleed K92.2    Hypokalemia E87.6       Isolation/Infection:   Isolation            No Isolation          Patient Infection Status       Infection Onset Added Last Indicated Last Indicated By Review Planned Expiration Resolved Resolved By    None active    Resolved    COVID-19 (Rule Out) 01/31/21 01/31/21 01/31/21 COVID-19 (Ordered)   01/31/21 Rule-Out Test Resulted            Nurse Assessment:  Last Vital Signs: BP (!) 109/59   Pulse 71   Temp 98.5 °F (36.9 °C) (Oral)   Resp 18   Ht 5' 4\" (1.626 m)   Wt 132 lb (59.9 kg)   SpO2 93%   BMI 22.66 kg/m²     Last documented pain score (0-10 scale): Pain Level: 0  Last Weight:   Wt Readings from Last 1 Encounters:   10/17/22 132 lb (59.9 kg)     Mental Status:  disoriented and alert    IV Access:  - None    Nursing Mobility/ADLs:  Walking   Dependent  Transfer  Dependent  Bathing  Dependent  Dressing  Dependent  Toileting  Dependent  Feeding  Independent  Med Admin  Assisted  Med Delivery   whole    Wound Care Documentation and Therapy:  Wound 01/31/21 Foot Left;Dorsal (Active)   Number of days: 623       Wound 01/31/21 Pretibial Proximal;Right (Active)   Number of days: 623       Wound 01/31/21 Hip Anterior;Proximal;Right (Active)   Wound Depth (cm) 0.1 cm 10/15/22 0658   Drainage Amount None 10/15/22 0658   Number of days: 623       Wound 01/31/21 Elbow Anterior;Right amputated (Active)   Number of days: 623       Wound 02/01/21 Heel Right (Active)   Number of days: 623       Wound 10/15/22 Hip Anterior;Right opened scab ,red and draining (Active)   Dressing Status Clean;Dry; Intact 10/17/22 0830   Wound Cleansed Cleansed with saline 10/16/22 1810   Dressing/Treatment Other (comment) 10/16/22 1810   Dressing Change Due 10/17/22 10/17/22 0830   Wound Length (cm) 1 cm 10/15/22 0706   Wound Width (cm) 1 cm 10/15/22 0706   Wound Depth (cm) 0.1 cm 10/15/22 0706   Wound Surface Area (cm^2) 1 cm^2 10/15/22 0706   Wound Volume (cm^3) 0.1 cm^3 10/15/22 0706   Drainage Amount None 10/16/22 1810   Drainage Description Sanguinous 10/15/22 2314   Yaneli-wound Assessment Fragile 10/16/22 1810   Number of days: 2       Wound 10/15/22 Foot Left (Active)   Dressing Status Clean;Dry; Intact 10/17/22 0830   Wound Cleansed Cleansed with saline 10/16/22 1810   Dressing/Treatment Other (comment) 10/16/22 1810   Dressing Change Due 10/17/22 10/17/22 0830   Wound Length (cm) 2 cm 10/15/22 0745   Wound Width (cm) 0.6 cm 10/15/22 0745   Wound Depth (cm) 0.4 cm 10/15/22 0745   Wound Surface Area (cm^2) 1.2 cm^2 10/15/22 0745   Wound Volume (cm^3) 0.48 cm^3 10/15/22 0745   Wound Assessment Pink/red; Other (Comment) 10/16/22 1810   Drainage Amount Scant 10/16/22 1810   Drainage Description Virgia Hany; Thin 10/16/22 1810   Yaneli-wound Assessment Warm;Fragile; Other (Comment) 10/16/22 1810   Number of days: 2        Elimination:  Continence: Bowel: No  Bladder: No  Urinary Catheter: None   Colostomy/Ileostomy/Ileal Conduit: No       Date of Last BM: ***    Intake/Output Summary (Last 24 hours) at 10/17/2022 1422  Last data filed at 10/17/2022 1322  Gross per 24 hour   Intake 2171.31 ml   Output 2100 ml   Net 71.31 ml     I/O last 3 completed shifts: In: 2423.7 [P.O.:600; I.V.:1823.7]  Out: 3500 [Urine:3500]    Safety Concerns:      At Risk for Falls    Impairments/Disabilities:      Amputation - Partial RUE    Nutrition Therapy:  Current Nutrition Therapy:   [address] Diet List:002420980}    Routes of Feeding: {CHP DME Other Feedings:064697855}  Liquids: {Slp liquid thickness:37989}  Daily Fluid Restriction: {CHP DME Yes amt example:624716853}  Last Modified Barium Swallow with Video (Video Swallowing Test): {Done Not Done EUNA:707037403}    Treatments at the Time of Hospital Discharge:   Respiratory Treatments: ***  Oxygen Therapy:  {Therapy; copd oxygen:51788}  Ventilator:    [address] Vent HXTO:750229303}    Rehab Therapies: Physical Therapy and Occupational Therapy  Weight Bearing Status/Restrictions: [address] Weight Bearin}  Other Medical Equipment (for information only, NOT a DME order):  {EQUIPMENT:710531409}  Other Treatments: ***    Patient's personal belongings (please select all that are sent with patient):  {CHP DME Belongings:052677822}    RN SIGNATURE:  {Esignature:564776368}    CASE MANAGEMENT/SOCIAL WORK SECTION    Inpatient Status Date: ***    Readmission Risk Assessment Score:  Readmission Risk              Risk of Unplanned Readmission:  16           Discharging to Facility/ Agency   Name: [facility]  Address: [address] [place] [address]   Phone: [phone]  Fax: [phone]    / signature: Electronically signed by CARIN Sabillon on 10/17/22 at 2:22 PM EDT    PHYSICIAN SECTION    Prognosis: Good    Condition at Discharge: Stable    Rehab Potential (if transferring to Rehab): Good    Recommended Labs or Other Treatments After Discharge: H/H daily x 2 days. Resume Eliquis when SNF MD feels appropriate. Physician Certification: I certify the above information and transfer of Dimitri Da Silva  is necessary for the continuing treatment of the diagnosis listed and that she requires Intermediate Nursing Care for greater 30 days.      Update Admission H&P: No change in H&P    PHYSICIAN SIGNATURE:  Electronically signed by Jennifer Balbuena MD on 10/18/22 at 2:35 PM EDT

## 2022-10-17 NOTE — PATIENT CARE CONFERENCE
[facility] Quality Flow/Interdisciplinary Rounds Progress Note        Quality Flow Rounds held on October 17, 2022    Disciplines Attending:  Bedside Nurse, , , and Nursing Unit Leadership    Krystin Urbina was admitted on 10/14/2022 11:14 PM    Anticipated Discharge Date:       Disposition:    Ravindra Score:  Ravindra Scale Score: 13    Readmission Risk              Risk of Unplanned Readmission:  14           Discussed patient goal for the day, patient clinical progression, and barriers to discharge.   The following Goal(s) of the Day/Commitment(s) have been identified:  Labs - Report Results      Clyde Dial RN  October 17, 2022

## 2022-10-17 NOTE — PROGRESS NOTES
Wound / ostomy dept attempted to see Pt for leg wound but she was off the unit. Will attempt another day.

## 2022-10-17 NOTE — PROGRESS NOTES
Patient for EGD today with Dr. Alexander Goyal. Additional questions and concerns addressed.   Labs and chart reviewed; potassium to be replaced per primary  Consent signed  [id number] Tameka Meredith to proceed    TOOTIE Chavira - CNP 10/17/2022 9:08 AM

## 2022-10-17 NOTE — ANESTHESIA POSTPROCEDURE EVALUATION
Department of Anesthesiology  Postprocedure Note    Patient: Dieudonne Phillips  MRN: [id number]  YOB: 1935  Date of evaluation: 10/17/2022      Procedure Summary     Date: 10/17/22 Room / Location: [facility] 02 / [facility]    Anesthesia Start: 7760 Anesthesia Stop: 7218    Procedure: EGD BIOPSY Diagnosis:       Pain      (Pain [R52])    Surgeons: Dona Christina MD Responsible Provider: Elio Aranda DO    Anesthesia Type: MAC ASA Status: 3          Anesthesia Type: No value filed.     Yonatan Phase I:      Yonatan Phase II:        Anesthesia Post Evaluation    Patient location during evaluation: bedside  Patient participation: complete - patient participated  Level of consciousness: awake and alert  Pain score: 0  Airway patency: patent  Nausea & Vomiting: no nausea and no vomiting  Complications: no  Cardiovascular status: blood pressure returned to baseline  Respiratory status: acceptable  Hydration status: euvolemic

## 2022-10-17 NOTE — CARE COORDINATION
Social Work discharge planning    Pt is from [employer]. Amanuel left message for [facility] liaison to check on status of bed hold. Electronically signed by Tej Richardson on 10/17/2022 at 2:20 PM     Addendum:   Per Liset with [facility], pt is a long term resident and bed hold. Precert is NOT needed to return. Pt will need a Covid test to be negative the day of discharge. Pt's legal guardian is through [employer]  [phone]. Amanuel spoke to guardian Rosalio Charlesbravo, and plan is to return to [facility] at discharge. N17 started in [address] [place]. Ambulance forms for [phone] are in pt's folder.   Electronically signed by Tej Richardson on 10/17/2022 at 2:59 PM

## 2022-10-17 NOTE — ANESTHESIA PRE PROCEDURE
Department of Anesthesiology  Preprocedure Note       Name:  Pastor Julien   Age:  81 y.o.  :  1935                                          MRN:  [id number]         Date:  10/17/2022      Surgeon: Constantin Everett):  Nj Oakes MD    Procedure: Procedure(s):  EGD ESOPHAGOGASTRODUODENOSCOPY    Medications prior to admission:   Prior to Admission medications    Medication Sig Start Date End Date Taking? Authorizing Provider   miconazole (MICOTIN) 2 % powder Apply 2 % topically every 8 hours as needed Apply to groin for redness, irritation. Yes Historical Provider, MD   insulin detemir (LEVEMIR FLEXTOUCH) 100 UNIT/ML injection pen Inject 10 Units into the skin nightly At bedtime. Yes Historical Provider, MD   metFORMIN (GLUCOPHAGE) 1000 MG tablet Take 1,000 mg by mouth 2 times daily   Yes Historical Provider, MD   Multiple Vitamins-Minerals (THERAPEUTIC MULTIVITAMIN-MINERALS) tablet Take 1 tablet by mouth daily   Yes Historical Provider, MD   famotidine (PEPCID) 20 MG tablet Take 20 mg by mouth 2 times daily   Yes Historical Provider, MD   carboxymethylcellulose 1 % ophthalmic solution Place 2 drops into both eyes every 4 hours as needed for Dry Eyes    Historical Provider, MD   insulin detemir (LEVEMIR) 100 UNIT/ML injection vial Inject 25 Units into the skin daily    Historical Provider, MD   traMADol (ULTRAM) 50 MG tablet Take 50 mg by mouth every 12 hours as needed for Pain.     Historical Provider, MD   insulin lispro (HUMALOG) 100 UNIT/ML injection vial Inject 0-10 Units into the skin 3 times daily (before meals) PER SLIDING SCALE: 0-149=0U, 150-200=2U, 201-250=4U, 251-300=6U, 301-350=8U, 351-399=10U, 400-999=10U & CALL MD    Historical Provider, MD   ammonium lactate (AMLACTIN) 12 % cream Apply topically every 12 hours as needed (FOR DRY SKIN OF BLE)     Historical Provider, MD   dextromethorphan-guaiFENesin (ROBITUSSIN COLD COUGH+ CHEST)  MG/5ML syrup Take 10 mLs by mouth every 4 hours as needed for Cough    Historical Provider, MD   apixaban (ELIQUIS) 2.5 MG TABS tablet Take 2.5 mg by mouth 2 times daily     Historical Provider, MD   pantoprazole (PROTONIX) 40 MG tablet Take 1 tablet by mouth every morning (before breakfast) 12/5/16   Scheryl Angelucci, MD   Sodium Phosphates (FLEET) 7-19 GM/118ML Place 1 enema rectally as needed (CONSTIPATION) Indications: -IF NO BM BY END OF SHIFT AFTER SUPP ADMIN-     Historical Provider, MD   bisacodyl (DULCOLAX) 10 MG suppository Place 1 suppository rectally as needed (CONSTIPATION) Indications: -NO BM IN 24HRS AFTER MOM ADMIN-     Historical Provider, MD   magnesium hydroxide (MILK OF MAGNESIA) 400 MG/5ML suspension Take 30 mLs by mouth as needed for Constipation Indications: -ADMIN IF NO BM BY 3RD DAY/9 SHIFTS-     Historical Provider, MD   memantine (NAMENDA XR) 28 MG CP24 capsule Take 28 mg by mouth at bedtime     Historical Provider, MD   acetaminophen (TYLENOL) 325 MG tablet Take 650 mg by mouth every 4 hours as needed for Pain    Historical Provider, MD   pravastatin (PRAVACHOL) 40 MG tablet Take 40 mg by mouth daily    Historical Provider, MD   amLODIPine (NORVASC) 10 MG tablet Take 10 mg by mouth daily    Historical Provider, MD       Current medications:    Current Facility-Administered Medications   Medication Dose Route Frequency Provider Last Rate Last Admin    insulin lispro (HUMALOG) injection vial 0-4 Units  0-4 Units SubCUTAneous TID WC Eliane Pellet, APRN - CNP        insulin lispro (HUMALOG) injection vial 0-4 Units  0-4 Units SubCUTAneous Nightly Eliane Pellet, APRN - CNP        amLODIPine (NORVASC) tablet 10 mg  10 mg Oral Daily Eliane Pellet, APRN - CNP   10 mg at 10/16/22 1623    pravastatin (PRAVACHOL) tablet 40 mg  40 mg Oral Daily Eliane Pellet, APRN - CNP   40 mg at 10/16/22 1623    insulin glargine (LANTUS) injection vial 10 Units  10 Units SubCUTAneous Nightly Janis Garrett MD   10 Units at 10/16/22 2106    memantine (NAMENDA) tablet 10 mg  10 mg Oral BID Odalis Reardon MD   10 mg at 10/16/22 2106    sodium chloride flush 0.9 % injection 5-40 mL  5-40 mL IntraVENous 2 times per day Odalis Reardon MD   10 mL at 10/16/22 2103    sodium chloride flush 0.9 % injection 5-40 mL  5-40 mL IntraVENous PRN Odalis Reardon MD        0.9 % sodium chloride infusion   IntraVENous PRBHARATI Reardon MD        ondansetron (ZOFRAN-ODT) disintegrating tablet 4 mg  4 mg Oral Q8H PRN Odalis Reardon MD        Or    ondansetron [facility]) injection 4 mg  4 mg IntraVENous Q6H PRBHARATI Reardon MD        acetaminophen (TYLENOL) tablet 650 mg  650 mg Oral Q6H PRN Odalis Reardon MD        Or   Geovany Felipe acetaminophen (TYLENOL) suppository 650 mg  650 mg Rectal Q6H PRBHARATI Reardon MD        0.9 % sodium chloride infusion   IntraVENous Continuous Odalis Reardon MD 75 mL/hr at 10/17/22 0832 New Bag at 10/17/22 0832    morphine (PF) injection 2 mg  2 mg IntraVENous Q3H PRBHARATI Reardon MD        LORazepam (ATIVAN) injection 0.5 mg  0.5 mg IntraVENous Q6H PRBHARATI Reardon MD   0.5 mg at 10/16/22 2104    glucose chewable tablet 16 g  4 tablet Oral PRN Odalis Reardon MD        dextrose bolus 10% 125 mL  125 mL IntraVENous LINA Reardon MD        Or    dextrose bolus 10% 250 mL  250 mL IntraVENous LINA Reardon MD        glucagon (rDNA) injection 1 mg  1 mg SubCUTAneous LINA Reardon MD        dextrose 10 % infusion   IntraVENous Continuous LINA Reardon MD        glucose chewable tablet 16 g  4 tablet Oral PRN Odalis Reardon MD        dextrose bolus 10% 125 mL  125 mL IntraVENous LINA Reardon MD        Or    dextrose bolus 10% 250 mL  250 mL IntraVENous PRBHARATI Reardon MD        glucagon (rDNA) injection 1 mg  1 mg SubCUTAneous LINA Reardon MD        dextrose 10 % infusion   IntraVENous Continuous LINA Reardon MD        pantoprazole (PROTONIX) 40 mg in sodium chloride (PF) 10 mL injection  40 mg IntraVENous BID Sourav Gentile MD   40 mg at 10/17/22 2560    white petrolatum ointment   Topical BID PRN Sourav Gentile MD   Given at 10/16/22 1852       Allergies:  No Known Allergies    Problem List:    Patient Active Problem List   Diagnosis Code    Hyperlipidemia E78.5    D[facility] ([facility] F03.90    GERD (gastroesophageal reflux disease) K21.9    D.M. 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 7/22/2015    Dementia ([facility]     DVT (deep venous thrombosis) ([facility] 11/24/2016    GERD (gastroesophageal reflux disease)     Hyperlipidemia     Hypertension     Left scapula fracture 7/22/2015    Metatarsal fracture 7/22/2015    Moderate protein malnutrition ([facility] 7/16/2015    Multiple fractures     legs, arms, ribs, sternum, facial    Radial styloid fracture 7/22/2015    Type II or unspecified type diabetes mellitus without mention of complication, not stated as uncontrolled        Past Surgical History:        Procedure Laterality Date    ABDOMEN SURGERY      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 David Machado Right 11/28/2016    REMOVAL OLD HARDWARE RIGHT HIP;TOTAL RIGHT HIP ARTHROPLASTY    VENA CAVA FILTER PLACEMENT  12/01/2016    Aby Lozoya    VENA CAVA FILTER PLACEMENT Right 12/01/2016       Social History:    Social History     Tobacco Use    Smoking status: Never    Smokeless tobacco: Current     Types: Chew    Tobacco comments: 60 years with chewing tobacco    Substance Use Topics    Alcohol use: No     Alcohol/week: 0.0 standard drinks                                Ready to quit: Not Answered  Counseling given: Not Answered  Tobacco comments: 60 years with chewing tobacco       Vital Signs (Current):   Vitals:    10/16/22 1623 10/16/22 1945 10/17/22 0011 10/17/22 0650   BP: (!) 108/55 128/70  (!) 109/59   Pulse:  84  71   Resp:  18  18   Temp:  98 °F (36.7 °C)  98.5 °F (36.9 °C)   TempSrc:  Oral  Oral   SpO2:  95%  93%   Weight:   132 lb (59.9 kg)    Height:                                                  BP Readings from Last 3 Encounters:   10/17/22 (!) 109/59   02/02/22 129/62   01/30/22 114/73       NPO Status: Time of last liquid consumption: 1123 (sip with med)                        Time of last solid consumption: 1123                        Date of last liquid consumption: 10/17/22                        Date of last solid food consumption: 10/16/22    BMI:   Wt Readings from Last 3 Encounters:   10/17/22 132 lb (59.9 kg)   02/02/22 160 lb (72.6 kg)   01/30/22 160 lb (72.6 kg)     Body mass index is 22.66 kg/m².     CBC:   Lab Results   Component Value Date/Time    WBC 12.4 10/14/2022 11:49 PM    RBC 4.14 10/14/2022 11:49 PM    HGB 10.1 10/17/2022 04:19 AM    HCT 31.1 10/17/2022 04:19 AM    MCV 87.9 10/14/2022 11:49 PM    RDW 13.8 10/14/2022 11:49 PM     10/14/2022 11:49 PM       CMP:   Lab Results   Component Value Date/Time     10/17/2022 04:19 AM    K 3.3 10/17/2022 04:19 AM    K 3.8 10/14/2022 11:49 PM     10/17/2022 04:19 AM    CO2 24 10/17/2022 04:19 AM    BUN 7 10/17/2022 04:19 AM    CREATININE 0.6 10/17/2022 04:19 AM    GFRAA >60 10/17/2022 04:19 AM    LABGLOM >60 10/17/2022 04:19 AM    GLUCOSE 126 10/17/2022 04:19 AM    PROT 7.1 10/14/2022 11:49 PM    CALCIUM 8.3 10/17/2022 04:19 AM    BILITOT 0.2 10/14/2022 11:49 PM    ALKPHOS 87 10/14/2022 11:49 PM    AST 19 10/14/2022 11:49 PM    ALT 13 10/14/2022 11:49 PM       POC Tests: No results for input(s): POCGLU, POCNA, POCK, POCCL, POCBUN, POCHEMO, POCHCT in the last 72 hours. Coags:   Lab Results   Component Value Date/Time    PROTIME 12.8 10/17/2022 04:19 AM    INR 1.2 10/17/2022 04:19 AM    APTT 32.2 10/15/2022 02:45 PM       HCG (If Applicable): No results found for: PREGTESTUR, PREGSERUM, HCG, HCGQUANT     ABGs: No results found for: PHART, PO2ART, JFV5KEQ, GDP0TWN, BEART, L2XCNODJ     Type & Screen (If Applicable):  No results found for: LABABO, LABRH    Drug/Infectious Status (If Applicable):  No results found for: HIV, HEPCAB    COVID-19 Screening (If Applicable):   Lab Results   Component Value Date/Time    COVID19 Not Detected 01/30/2022 03:05 PM           Anesthesia Evaluation  Patient summary reviewed no history of anesthetic complications:   Airway: Mallampati: III  TM distance: >3 FB   Neck ROM: full  Mouth opening: > = 3 FB   Dental:          Pulmonary:Negative Pulmonary ROS breath sounds clear to auscultation                             Cardiovascular:    (+) hypertension:,         Rhythm: regular  Rate: normal           Beta Blocker:  Not on Beta Blocker         Neuro/Psych:   (+) psychiatric history (dementia):            GI/Hepatic/Renal:   (+) GERD:,           Endo/Other:    (+) DiabetesType II DM, , .                 Abdominal:             Vascular: negative vascular ROS. Other Findings:           Anesthesia Plan      MAC     ASA 3       Induction: intravenous. MIPS: Postoperative opioids intended and Prophylactic antiemetics administered. Anesthetic plan and risks discussed with patient. Plan discussed with CRNA. Kelley Schuster DO   10/17/2022        Patient seen and chart reviewed. No interval change in history or exam.   Anesthesia plan discussed, risk/benefits addressed. Patient's concerns and questions answered.      TOOTIE Rose - LAVERN  October 17, 2022  3:32 PM

## 2022-10-17 NOTE — PROGRESS NOTES
[facility] Progress Note    Admitting Date and Time: 10/14/2022 11:14 PM  Admit Dx: Hematemesis [K92.0]  Hematemesis, unspecified whether nausea present [K92.0]  Upper GI bleed [K92.2]    Subjective:  Patient is being followed for Hematemesis [K92.0]  Hematemesis, unspecified whether nausea present [K92.0]  Upper GI bleed [K92.2]     Patient awake and alert- resting in bed in no acute distress watching TV  Reporting she currently feels good and has no complaints  Denies nausea/ abdominal pain  Plans for EGD this afternoon- tentatively 3 pm per nursing         ROS: denies fever, chills, cp, sob, n/v, HA unless stated above.       insulin lispro  0-4 Units SubCUTAneous TID WC    insulin lispro  0-4 Units SubCUTAneous Nightly    amLODIPine  10 mg Oral Daily    pravastatin  40 mg Oral Daily    insulin glargine  10 Units SubCUTAneous Nightly    memantine  10 mg Oral BID    sodium chloride flush  5-40 mL IntraVENous 2 times per day    pantoprazole (PROTONIX) 40 mg injection  40 mg IntraVENous BID     sodium chloride flush, 5-40 mL, PRN  sodium chloride, , PRN  ondansetron, 4 mg, Q8H PRN   Or  ondansetron, 4 mg, Q6H PRN  acetaminophen, 650 mg, Q6H PRN   Or  acetaminophen, 650 mg, Q6H PRN  morphine, 2 mg, Q3H PRN  LORazepam, 0.5 mg, Q6H PRN  glucose, 4 tablet, PRN  dextrose bolus, 125 mL, PRN   Or  dextrose bolus, 250 mL, PRN  glucagon (rDNA), 1 mg, PRN  dextrose, , Continuous PRN  glucose, 4 tablet, PRN  dextrose bolus, 125 mL, PRN   Or  dextrose bolus, 250 mL, PRN  glucagon (rDNA), 1 mg, PRN  dextrose, , Continuous PRN  white petrolatum, , BID PRN         Objective:    BP (!) 109/59   Pulse 71   Temp 98.5 °F (36.9 °C) (Oral)   Resp 18   Ht 5' 4\" (1.626 m)   Wt 132 lb (59.9 kg)   SpO2 93%   BMI 22.66 kg/m²   General Appearance: alert and oriented to person, place and time and in no acute distress  Skin: warm and dry  Head: normocephalic and atraumatic  Neck: neck supple and non tender without mass   Pulmonary/Chest: clear to auscultation bilaterally-   Cardiovascular: normal rate, normal S1 and S2 and no carotid bruits  Abdomen: soft, non-tender, slightly distended, normal bowel sounds, no masses or organomegaly  Extremities: no cyanosis, no clubbing and no edema  Neurologic: speech normal         Recent Labs     10/14/22  2349 10/16/22  0520 10/17/22  0419    142 142   K 3.8 3.5 3.3*   CL 99 110* 109*   CO2 21* 26 24   BUN 17 11 7   CREATININE 0.6 0.6 0.6   GLUCOSE 205* 106* 126*   CALCIUM 9.6 8.1* 8.3*       Recent Labs     10/14/22  2349 10/15/22  0847 10/16/22  0520 10/16/22  2320 10/17/22  0419   WBC 12.4*  --   --   --   --    RBC 4.14  --   --   --   --    HGB 12.1   < > 10.4* 9.9* 10.1*   HCT 36.4   < > 32.1* 30.1* 31.1*   MCV 87.9  --   --   --   --    MCH 29.2  --   --   --   --    MCHC 33.2  --   --   --   --    RDW 13.8  --   --   --   --      --   --   --   --    MPV 9.6  --   --   --   --     < > = values in this interval not displayed. Assessment:    Principal Problem:    Hematemesis  Active Problems:    Upper GI bleed  Resolved Problems:    * No resolved hospital problems. *      Plan:  1. Acute GI bleed: pt presented to the ER with coffee ground emesis at rehab facility. Imaging CT abdomen/ pelvis in ER showed gastritis. Pt has a history of DVT of right lower femoral vein and is on eliquis. Eliquis placed on hold. Hg 12.1 in ER- noted to be trending down. 10.1 today. GI consulted- appreciate input. EGD this afternoon- tentatively 3pm. Patient awake and alert- watching TV. She currently denies nausea or pain. 2. Acute blood loss anemia: associated to above. Hg down trended 10. 1. has been on IVF. Transfuse for less than 7.0    3. History of DVT right lower femoral vein: eliquis on hold    4. Alzheimer's dementia: continue namenda. Pleasant and cooperative during exam.     5. HTN: norvasc- monitor BP     6. Dm type 2: hga1c 7.7. continue ssi and lantus.      7. HLD: statin    8. GERD: PPI    9. Hypokalemia: supplement       Dispo: social work to follow. Noted that patient was sent to ER from Rehab facility. Time spent reviewing chart, clinical exam, discussing case and answering questions with staff/consultants/patient/family = 20 minutes       NOTE: This report was transcribed using voice recognition software. Every effort was made to ensure accuracy; however, inadvertent computerized transcription errors may be present.   Electronically signed by WONG Nicolas on 10/17/2022 at 9:05 AM

## 2022-10-18 VITALS
BODY MASS INDEX: 24.24 KG/M2 | TEMPERATURE: 98.2 F | RESPIRATION RATE: 16 BRPM | SYSTOLIC BLOOD PRESSURE: 106 MMHG | DIASTOLIC BLOOD PRESSURE: 56 MMHG | OXYGEN SATURATION: 95 % | WEIGHT: 142 LBS | HEIGHT: 64 IN | HEART RATE: 84 BPM

## 2022-10-18 LAB
ANION GAP SERPL CALCULATED.3IONS-SCNC: 9 MMOL/L (ref 7–16)
BUN BLDV-MCNC: 5 MG/DL (ref 6–23)
CALCIUM SERPL-MCNC: 8.3 MG/DL (ref 8.6–10.2)
CHLORIDE BLD-SCNC: 107 MMOL/L (ref 98–107)
CO2: 24 MMOL/L (ref 22–29)
CREAT SERPL-MCNC: 0.6 MG/DL (ref 0.5–1)
GFR SERPL CREATININE-BSD FRML MDRD: >60 ML/MIN/1.73
GLUCOSE BLD-MCNC: 111 MG/DL (ref 74–99)
HCT VFR BLD CALC: 32.7 % (ref 34–48)
HEMOGLOBIN: 10.6 G/DL (ref 11.5–15.5)
MAGNESIUM: 1.5 MG/DL (ref 1.6–2.6)
METER GLUCOSE: 105 MG/DL (ref 74–99)
METER GLUCOSE: 166 MG/DL (ref 74–99)
METER GLUCOSE: 174 MG/DL (ref 74–99)
POTASSIUM SERPL-SCNC: 3.3 MMOL/L (ref 3.5–5)
SARS-COV-2, NAAT: NOT DETECTED
SODIUM BLD-SCNC: 140 MMOL/L (ref 132–146)

## 2022-10-18 PROCEDURE — 83735 ASSAY OF MAGNESIUM: CPT

## 2022-10-18 PROCEDURE — 85018 HEMOGLOBIN: CPT

## 2022-10-18 PROCEDURE — 6370000000 HC RX 637 (ALT 250 FOR IP): Performed by: NURSE PRACTITIONER

## 2022-10-18 PROCEDURE — 82962 GLUCOSE BLOOD TEST: CPT

## 2022-10-18 PROCEDURE — 85014 HEMATOCRIT: CPT

## 2022-10-18 PROCEDURE — 6360000002 HC RX W HCPCS: Performed by: NURSE PRACTITIONER

## 2022-10-18 PROCEDURE — 99239 HOSP IP/OBS DSCHRG MGMT >30: CPT | Performed by: NURSE PRACTITIONER

## 2022-10-18 PROCEDURE — 36415 COLL VENOUS BLD VENIPUNCTURE: CPT

## 2022-10-18 PROCEDURE — 6360000002 HC RX W HCPCS: Performed by: INTERNAL MEDICINE

## 2022-10-18 PROCEDURE — A4216 STERILE WATER/SALINE, 10 ML: HCPCS | Performed by: INTERNAL MEDICINE

## 2022-10-18 PROCEDURE — 6370000000 HC RX 637 (ALT 250 FOR IP): Performed by: INTERNAL MEDICINE

## 2022-10-18 PROCEDURE — 2580000003 HC RX 258: Performed by: INTERNAL MEDICINE

## 2022-10-18 PROCEDURE — 80048 BASIC METABOLIC PNL TOTAL CA: CPT

## 2022-10-18 PROCEDURE — C9113 INJ PANTOPRAZOLE SODIUM, VIA: HCPCS | Performed by: INTERNAL MEDICINE

## 2022-10-18 PROCEDURE — 6370000000 HC RX 637 (ALT 250 FOR IP): Performed by: FAMILY MEDICINE

## 2022-10-18 PROCEDURE — 87635 SARS-COV-2 COVID-19 AMP PRB: CPT

## 2022-10-18 RX ORDER — PSEUDOEPHEDRINE HCL 30 MG
100 TABLET ORAL DAILY
DISCHARGE
Start: 2022-10-19

## 2022-10-18 RX ORDER — POTASSIUM CHLORIDE 20 MEQ/1
40 TABLET, EXTENDED RELEASE ORAL ONCE
Status: COMPLETED | OUTPATIENT
Start: 2022-10-18 | End: 2022-10-18

## 2022-10-18 RX ORDER — PANTOPRAZOLE SODIUM 40 MG/1
40 TABLET, DELAYED RELEASE ORAL
Status: DISCONTINUED | OUTPATIENT
Start: 2022-10-19 | End: 2022-10-18 | Stop reason: HOSPADM

## 2022-10-18 RX ORDER — MAGNESIUM SULFATE IN WATER 40 MG/ML
2000 INJECTION, SOLUTION INTRAVENOUS ONCE
Status: COMPLETED | OUTPATIENT
Start: 2022-10-18 | End: 2022-10-18

## 2022-10-18 RX ORDER — DOCUSATE SODIUM 100 MG/1
100 CAPSULE, LIQUID FILLED ORAL DAILY
Status: DISCONTINUED | OUTPATIENT
Start: 2022-10-18 | End: 2022-10-18 | Stop reason: HOSPADM

## 2022-10-18 RX ORDER — INSULIN GLARGINE 100 [IU]/ML
10 INJECTION, SOLUTION SUBCUTANEOUS NIGHTLY
Qty: 10 ML | Refills: 3 | DISCHARGE
Start: 2022-10-18

## 2022-10-18 RX ADMIN — POTASSIUM CHLORIDE 40 MEQ: 1500 TABLET, EXTENDED RELEASE ORAL at 09:39

## 2022-10-18 RX ADMIN — SODIUM CHLORIDE: 9 INJECTION, SOLUTION INTRAVENOUS at 05:13

## 2022-10-18 RX ADMIN — SODIUM CHLORIDE, PRESERVATIVE FREE 10 ML: 5 INJECTION INTRAVENOUS at 09:39

## 2022-10-18 RX ADMIN — SODIUM CHLORIDE, PRESERVATIVE FREE 40 MG: 5 INJECTION INTRAVENOUS at 09:39

## 2022-10-18 RX ADMIN — AMLODIPINE BESYLATE 10 MG: 10 TABLET ORAL at 09:39

## 2022-10-18 RX ADMIN — MEMANTINE HYDROCHLORIDE 10 MG: 10 TABLET, FILM COATED ORAL at 09:39

## 2022-10-18 RX ADMIN — PRAVASTATIN SODIUM 40 MG: 20 TABLET ORAL at 09:39

## 2022-10-18 RX ADMIN — MAGNESIUM SULFATE HEPTAHYDRATE 2000 MG: 40 INJECTION, SOLUTION INTRAVENOUS at 09:55

## 2022-10-18 RX ADMIN — DOCUSATE SODIUM 100 MG: 100 CAPSULE, LIQUID FILLED ORAL at 11:04

## 2022-10-18 NOTE — CARE COORDINATION
Social Work discharge planning    Pt returning to [address] [place] [address] at [address] [facility] via [place] (spoke to Lexie Whitmore). Charge RN [place], pt's Rn [place] [employer] at [employer], and pt's guardian Garry Ford at [employer]  all aware. Covid test sent. Per Hayde Hazel at [address] [place] [address], even if pt's Covid test today is positive, they CAN still take pt back today. They just need to know either way to know if she needs isolation or not.   Electronically signed by Jluis Sanchez on 10/18/2022 at 3:00 PM

## 2022-10-18 NOTE — PLAN OF CARE
Problem: Safety - Adult  Goal: Free from fall injury  10/18/2022 1041 by Carmelo Fink RN  Outcome: Progressing  10/18/2022 0132 by Dorita Hill RN  Outcome: Progressing     Problem: Skin/Tissue Integrity  Goal: Absence of new skin breakdown  Description: 1. Monitor for areas of redness and/or skin breakdown  2. Assess vascular access sites hourly  3. Every 4-6 hours minimum:  Change oxygen saturation probe site  4. Every 4-6 hours:  If on nasal continuous positive airway pressure, respiratory therapy assess nares and determine need for appliance change or resting period.   10/18/2022 1041 by Carmelo Fink RN  Outcome: Progressing  10/18/2022 0132 by Dorita Hill RN  Outcome: Progressing     Problem: Chronic Conditions and Co-morbidities  Goal: Patient's chronic conditions and co-morbidity symptoms are monitored and maintained or improved  10/18/2022 1041 by Carmelo Fink RN  Outcome: Progressing  10/18/2022 0132 by Dorita Hill RN  Outcome: Progressing

## 2022-10-18 NOTE — PROGRESS NOTES
PROGRESS NOTE        Patient Presents with/Seen in Consultation For      *Reason for Consult: upper gi bleed  CHIEF COMPLAINT:  coffee ground emesis per facility  Subjective:     Patient seen sitting in bed in no apparent distress. Denies nausea or abdominal pain. EGD reviewed with patient. No BM noted. Plan of care discussed with patient and nursing. Review of Systems  Aside from what was mentioned in the PMH and HPI, essentially unremarkable, all others negative. Objective:     BP (!) 106/56   Pulse 84   Temp 98.2 °F (36.8 °C) (Oral)   Resp 16   Ht 5' 4\" (1.626 m)   Wt 142 lb (64.4 kg)   SpO2 95%   BMI 24.37 kg/m²     General appearance: alert, awake, laying in bed, and cooperative  Eyes: conjunctiva pale, sclera anicteric. PERRL.   Lungs: clear to auscultation bilaterally  Heart: regular rate and rhythm, no murmur, 2+ pulses; no edema  Abdomen: soft, non-tender; bowel sounds normal; no masses,  no organomegaly  Extremities: extremities without edema  Pulses: 2+ and symmetric  Skin: Skin color, texture, turgor normal.   Neurologic: Grossly normal, confusion noted     magnesium sulfate 2000 mg in 50 mL IVPB premix, Once  [START ON 10/19/2022] pantoprazole (PROTONIX) tablet 40 mg, QAM AC  docusate sodium (COLACE) capsule 100 mg, Daily  insulin lispro (HUMALOG) injection vial 0-4 Units, TID WC  insulin lispro (HUMALOG) injection vial 0-4 Units, Nightly  amLODIPine (NORVASC) tablet 10 mg, Daily  pravastatin (PRAVACHOL) tablet 40 mg, Daily  insulin glargine (LANTUS) injection vial 10 Units, Nightly  memantine (NAMENDA) tablet 10 mg, BID  sodium chloride flush 0.9 % injection 5-40 mL, 2 times per day  sodium chloride flush 0.9 % injection 5-40 mL, PRN  0.9 % sodium chloride infusion, PRN  ondansetron (ZOFRAN-ODT) disintegrating tablet 4 mg, Q8H PRN   Or  ondansetron (ZOFRAN) injection 4 mg, Q6H PRN  acetaminophen (TYLENOL) tablet 650 mg, Q6H PRN   Or  acetaminophen (TYLENOL) suppository 650 mg, Q6H PRN  0.9 % sodium chloride infusion, Continuous  morphine (PF) injection 2 mg, Q3H PRN  LORazepam (ATIVAN) injection 0.5 mg, Q6H PRN  glucose chewable tablet 16 g, PRN  dextrose bolus 10% 125 mL, PRN   Or  dextrose bolus 10% 250 mL, PRN  glucagon (rDNA) injection 1 mg, PRN  dextrose 10 % infusion, Continuous PRN  glucose chewable tablet 16 g, PRN  dextrose bolus 10% 125 mL, PRN   Or  dextrose bolus 10% 250 mL, PRN  glucagon (rDNA) injection 1 mg, PRN  dextrose 10 % infusion, Continuous PRN  white petrolatum ointment, BID PRN       Data Review  CBC:   Lab Results   Component Value Date/Time    WBC 12.4 10/14/2022 11:49 PM    RBC 4.14 10/14/2022 11:49 PM    HGB 10.6 10/18/2022 05:30 AM    HCT 32.7 10/18/2022 05:30 AM    MCV 87.9 10/14/2022 11:49 PM    MCH 29.2 10/14/2022 11:49 PM    MCHC 33.2 10/14/2022 11:49 PM    RDW 13.8 10/14/2022 11:49 PM     10/14/2022 11:49 PM    MPV 9.6 10/14/2022 11:49 PM     CMP:    Lab Results   Component Value Date/Time     10/18/2022 02:55 AM    K 3.3 10/18/2022 02:55 AM    K 3.8 10/14/2022 11:49 PM     10/18/2022 02:55 AM    CO2 24 10/18/2022 02:55 AM    BUN 5 10/18/2022 02:55 AM    CREATININE 0.6 10/18/2022 02:55 AM    GFRAA >60 10/17/2022 04:19 AM    LABGLOM >60 10/18/2022 02:55 AM    GLUCOSE 111 10/18/2022 02:55 AM    PROT 7.1 10/14/2022 11:49 PM    LABALBU 3.9 10/14/2022 11:49 PM    CALCIUM 8.3 10/18/2022 02:55 AM    BILITOT 0.2 10/14/2022 11:49 PM    ALKPHOS 87 10/14/2022 11:49 PM    AST 19 10/14/2022 11:49 PM    ALT 13 10/14/2022 11:49 PM     Hepatic Function Panel:    Lab Results   Component Value Date/Time    ALKPHOS 87 10/14/2022 11:49 PM    ALT 13 10/14/2022 11:49 PM    AST 19 10/14/2022 11:49 PM    PROT 7.1 10/14/2022 11:49 PM    BILITOT 0.2 10/14/2022 11:49 PM    BILIDIR 2.1 05/15/2015 08:25 AM    LABALBU 3.9 10/14/2022 11:49 PM     No components found for: CHLPL  No results found for: TRIG  No results found for: HDL  No results found for: LDLCALC  No results found for: LABVLDL   PT/INR:    Lab Results   Component Value Date/Time    PROTIME 12.8 10/17/2022 04:19 AM    INR 1.2 10/17/2022 04:19 AM     IRON:    Lab Results   Component Value Date/Time    IRON 133 10/15/2022 02:45 PM     Iron Saturation:  No components found for: PERCENTFE  FERRITIN:  No results found for: FERRITIN      Assessment:     Active Problems:  Coffee ground emesis- per facility- resolved  Anemia, normocytic  Dementia  HTN  DM2  HX of DVT of RLE- on Eliquis  EGD 10/17/22- Essentially normal upper endoscopy with normal esophagus and minimal gastritis. Biopsies were done to rule out H. pylori infection. Duodenum was unremarkable. Biopsies were done to rule out sprue. Plan:     Diet as tolerated  Colace daily   Protonix 40 mg PO daily   Hgb stable no gross signs of bleeding  Discharge per PCP, ok from GI POV      Note: This report was completed utilizing computer voice recognition software. Every effort has been made to ensure accuracy, however; inadvertent computerized transcription errors may be present.      Discussed with Dr. Harriet Diaz per Dr. Aneesh Lyman APRN-NP-C 10/18/2022  10:38 AM For Dr. Michael Parry

## 2022-10-18 NOTE — PATIENT CARE CONFERENCE
P Quality Flow/Interdisciplinary Rounds Progress Note        Quality Flow Rounds held on October 18, 2022    Disciplines Attending:  Bedside Nurse, , , and Nursing Unit Leadership    Edwardo Brennan was admitted on 10/14/2022 11:14 PM    Anticipated Discharge Date:       Disposition:    Ravindra Score:  Ravindra Scale Score: 13    Readmission Risk              Risk of Unplanned Readmission:  17           Discussed patient goal for the day, patient clinical progression, and barriers to discharge.   The following Goal(s) of the Day/Commitment(s) have been identified:  Discharge - Obtain Order      Ashwini Recinos RN  October 18, 2022

## 2022-10-18 NOTE — DISCHARGE SUMMARY
[facility] Physician Discharge Summary       [address] [facility] [address]  [address] [place] [address] Teri [address]  [phone]    Schedule an appointment as soon as possible for a visit      Ernesto Lancaster MD  [address]  [phone]    Schedule an appointment as soon as possible for a visit  GI      Activity level: As tolerated     Dispo: SNF     Condition on discharge: Stable     Patient ID:  Loan Mendosa  [id number]  81 y.o.  1935    Admit date: 10/14/2022    Discharge date and time:  10/18/2022  2:36 PM    Admission Diagnoses:   Principal Problem:    Hematemesis  Active Problems:    Upper GI bleed    Hypokalemia  Resolved Problems:    * No resolved hospital problems. *      Discharge Diagnoses:   Principal Problem:    Hematemesis  Active Problems:    Upper GI bleed    Hypokalemia  Resolved Problems:    * No resolved hospital problems. *      Consults:  IP CONSULT TO SOCIAL WORK  IP CONSULT TO GI  IP CONSULT TO [address] Course:   Patient Loan Mendosa is a 81 y.o. presented with Hematemesis [K92.0]  Hematemesis, unspecified whether nausea present [K92.0]  Upper GI bleed [K92.2]   Patient was sent to ER from nursing home for hematemesis and concern for upper GI bleed. GI was consulted. She was follwoed treated for;    1. Hematemesis/ concern for acute GI bleed: pt presented to the ER with coffee ground emesis at rehab facility. Imaging CT abdomen/ pelvis in ER showed gastritis. Pt has a history of DVT of right lower femoral vein and is on eliquis. Eliquis placed on hold. Hg 12.1 in ER-hg stable at 10/6 on discharge. GI consulted- appreciate input. S/p EGD on 10/17 showing gastritis. Biopsy done to rule out H pylori. Pt has been tolerating diet and no further hematemesis. Hg stable. Ok to discharge per gi on colace and PPI.  Continue to keep eliquis on hold. Daily H & H at SNF- if stable - PCP can resume at their discretion. 2. Acute blood loss anemia: associated to above. Hg stable. Will need close monitoring of H &H at [facility].      3. History of DVT right lower femoral vein: eliquis on hold. Discussed with attending. Would like SNF to get daily H & H to make sure hg stable prior to resuming eliquis. If hg stable- PCP/ at SNF can determine when ok to resume. EGD showing gastritis. 4. Alzheimer's dementia: continue namenda. Pleasant and cooperative during exam.      5. HTN: norvasc- monitor BP      6. Dm type 2: hga1c 7.7. continue ssi and lantus. 7. HLD: statin     8. GERD: PPI     9. Hypokalemia/ hypomagnesemia: supplement                 Patient with no further hematemesis. Tolerating diet. Eliquis has been on hold. Patient to be discharged back to SNF in stable condition with the following medications, instructions and follow up. Discharge Exam:    General Appearance: alert and oriented to person, place and time and in no acute distress  Skin: warm and dry  Head: normocephalic and atraumatic  Neck: neck supple and non tender without mass   Pulmonary/Chest: clear to auscultation bilaterally  Cardiovascular: normal rate, normal S1 and S2 and no carotid bruits  Abdomen: soft, non-tender, non-distended, normal bowel sounds, no masses or organomegaly  Extremities: no cyanosis, no clubbing and no edema  Neurologic: speech normal     I/O last 3 completed shifts: In: 2131.3 [P.O.:120;  I.V.:2011.3]  Out: 2600 [Urine:2600]  I/O this shift:  In: 180 [P.O.:180]  Out: -       LABS:  Recent Labs     10/16/22  0520 10/17/22  0419 10/18/22  0255    142 140   K 3.5 3.3* 3.3*   * 109* 107   CO2 26 24 24   BUN 11 7 5*   CREATININE 0.6 0.6 0.6   GLUCOSE 106* 126* 111*   CALCIUM 8.1* 8.3* 8.3*       Recent Labs     10/17/22  0419 10/17/22  1710 10/18/22  0530   HGB 10.1* 11.3* 10.6*   HCT 31.1* 33.3* 32.7*       No results for input(s): POCGLU in the last 72 hours. Imaging:  CT ABDOMEN PELVIS W IV CONTRAST Additional Contrast? None    Result Date: 10/15/2022  EXAMINATION: CT OF THE ABDOMEN AND PELVIS WITH CONTRAST 10/15/2022 1:48 am TECHNIQUE: CT of the abdomen and pelvis was performed with the administration of intravenous contrast. Multiplanar reformatted images are provided for review. Automated exposure control, iterative reconstruction, and/or weight based adjustment of the mA/kV was utilized to reduce the radiation dose to as low as reasonably achievable. COMPARISON: None. HISTORY: ORDERING SYSTEM PROVIDED HISTORY: Vomiting/Possible Hematemesis TECHNOLOGIST PROVIDED HISTORY: Additional Contrast?->None Reason for exam:->Vomiting/Possible Hematemesis FINDINGS: Lower Chest: Gastroesophageal reflux. Small fatty left posterior diaphragmatic hernia. Cardiomegaly. Atelectasis. Organs: Cholelithiasis. Liver, gallbladder, bilateral adrenal glands, bilateral kidneys, spleen and pancreas are otherwise normal. GI/Bowel: No ileus or obstruction. Gastric fluid distension with gastroesophageal reflux observed. No varicosities or evidence for variceal hemorrhage. Pelvis: No adnexal mass or fluid detected. Limitation secondary to right hip arthroplasty. Peritoneum/Retroperitoneum: IVC filter in position. Bones/Soft Tissues: Osteopenia. Chronic L2 and L4 compression deformities. 1. No acute disease. 2. Gastric fluid distension with gastroesophageal reflux observed. No varicosities or CT evidence for variceal hemorrhage. RECOMMENDATIONS: Careful clinical correlation and follow up recommended.        Patient Instructions:      Medication List        START taking these medications      docusate 100 MG Caps  Commonly known as: COLACE, DULCOLAX  Take 100 mg by mouth daily  Start taking on: October 19, 2022     insulin glargine 100 UNIT/ML injection vial  Commonly known as: LANTUS  Inject 10 Units into the skin nightly  Replaces: Levemir 100 UNIT/ML injection vial     white petrolatum Oint ointment  Apply topically 2 times daily as needed (irritated area) Apply to buttocks and groin.             CONTINUE taking these medications      acetaminophen 325 MG tablet  Commonly known as: TYLENOL     amLODIPine 10 MG tablet  Commonly known as: NORVASC     ammonium lactate 12 % cream  Commonly known as: AMLACTIN     carboxymethylcellulose 1 % ophthalmic solution     Dulcolax 10 MG suppository  Generic drug: bisacodyl     fleet 7-19 GM/118ML     insulin lispro 100 UNIT/ML injection vial  Commonly known as: HUMALOG     magnesium hydroxide 400 MG/5ML suspension  Commonly known as: MILK OF MAGNESIA     metFORMIN 1000 MG tablet  Commonly known as: GLUCOPHAGE     miconazole 2 % powder  Commonly known as: MICOTIN     Namenda XR 28 MG Cp24 extended release capsule  Generic drug: memantine ER     pantoprazole 40 MG tablet  Commonly known as: PROTONIX  Take 1 tablet by mouth every morning (before breakfast)     pravastatin 40 MG tablet  Commonly known as: PRAVACHOL     Robitussin Cold Cough+ Chest  MG/5ML syrup  Generic drug: dextromethorphan-guaiFENesin     therapeutic multivitamin-minerals tablet     traMADol 50 MG tablet  Commonly known as: ULTRAM            STOP taking these medications      apixaban 2.5 MG Tabs tablet  Commonly known as: ELIQUIS     famotidine 20 MG tablet  Commonly known as: PEPCID     Levemir 100 UNIT/ML injection vial  Generic drug: insulin detemir  Replaced by: insulin glargine 100 UNIT/ML injection vial     Levemir FlexTouch 100 UNIT/ML injection pen  Generic drug: insulin detemir               Where to Get Your Medications        Information about where to get these medications is not yet available    Ask your nurse or doctor about these medications  docusate 100 MG Caps  insulin glargine 100 UNIT/ML injection vial  white petrolatum Oint ointment           Note that more than 32 minutes was spent in preparing discharge papers, discussing discharge with patient, medication review, etc.    Signed:  Electronically signed by WONG Angel on 10/18/2022 at 2:36 PM

## 2022-10-18 NOTE — OP NOTE
[address] [address]                                OPERATIVE REPORT    PATIENT NAME: Marcia Ureña                      :        1935  MED REC NO:   [id number]                            ROOM:       6978  ACCOUNT NO:   [de-identified]                           ADMIT DATE: 10/14/2022  PROVIDER:     Adeola Smith MD    DATE OF PROCEDURE:  10/17/2022    PROCEDURE PERFORMED:  Upper endoscopy with biopsy. PREOPERATIVE DIAGNOSIS:  Evaluation for coffee-ground emesis. POSTOPERATIVE DIAGNOSES:  Essentially normal upper endoscopy with normal  esophagus and minimal gastritis. Biopsies were done to rule out H.  pylori infection. Duodenum was unremarkable. Biopsies were done to  rule out sprue. ANESTHESIA:  LMAC. NOTE:  Prior to the procedure an informed consent was obtained from the  patient after explaining the benefits as well as the risks,  alternatives, and complications of the procedure to the patient, who  understood and agreed. PROCEDURE:  With the patient in the left lateral decubitus position, the  Olympus GIF-100 forward-viewing videoscope was introduced into the  esophagus, the evaluation of which was essentially unremarkable and no  hiatal hernia was seen. The scope was then advanced through the gastroesophageal junction into  the gastric body, along the greater curvature. Evaluation of the body  of the stomach showed minimal gastritis, biopsied to rule out H. pylori  infection. The scope was then advanced through the pylorus into the duodenal bulb  and second portion of the duodenum, both of which appeared to be  unremarkable. Duodenum biopsied to rule out sprue. The scope was then  retrieved and retroflexed in the prepyloric antrum, with thorough  evaluation of the cardiac and fundal portions of the stomach, which  appeared to be within normal limits.     The scope was then straightened, the area deflated, and the procedure  was terminated by withdrawing the scope and conducting a second look on  the way out, which was essentially the same. The patient tolerated the procedure well.         Nicki Clinton MD    D: 10/17/2022 16:16:38       T: 10/17/2022 16:18:57     [email or web address]  Job#: [id number]     Doc#: [id number]    CC:  MD Nuzhat Hickey MD

## 2022-10-18 NOTE — DISCHARGE INSTR - DIET

## 2022-10-19 LAB — CLOTEST: NORMAL

## 2022-10-19 NOTE — PROGRESS NOTES
Family called for an update on pt status. Informed them that pt was picked up by EMS to go to facility.

## 2022-10-20 LAB — WOUND/ABSCESS: NORMAL

## 2022-12-06 ENCOUNTER — HOSPITAL ENCOUNTER (EMERGENCY)
Age: 87
Discharge: HOME | End: 2022-12-07
Attending: EMERGENCY MEDICINE
Payer: MEDICARE

## 2022-12-06 VITALS
TEMPERATURE: 97.7 F | OXYGEN SATURATION: 97 % | HEART RATE: 96 BPM | HEIGHT: 64 IN | SYSTOLIC BLOOD PRESSURE: 144 MMHG | WEIGHT: 142 LBS | RESPIRATION RATE: 16 BRPM | DIASTOLIC BLOOD PRESSURE: 113 MMHG | BODY MASS INDEX: 24.24 KG/M2

## 2022-12-06 DIAGNOSIS — R11.2 NAUSEA VOMITING AND DIARRHEA: Primary | ICD-10-CM

## 2022-12-06 DIAGNOSIS — R19.7 NAUSEA VOMITING AND DIARRHEA: Primary | ICD-10-CM

## 2022-12-06 DIAGNOSIS — Z71.89 DNR (DO NOT RESUSCITATE) DISCUSSION: ICD-10-CM

## 2022-12-06 PROCEDURE — 99283 EMERGENCY DEPT VISIT LOW MDM: CPT

## 2022-12-06 RX ORDER — APIXABAN 2.5 MG/1
TABLET, FILM COATED ORAL
COMMUNITY
Start: 2022-11-29

## 2022-12-06 RX ORDER — INSULIN DETEMIR 100 [IU]/ML
INJECTION, SOLUTION SUBCUTANEOUS
COMMUNITY
Start: 2022-11-30

## 2022-12-06 RX ORDER — LISINOPRIL 5 MG/1
TABLET ORAL
COMMUNITY
Start: 2022-11-29

## 2022-12-06 ASSESSMENT — PAIN - FUNCTIONAL ASSESSMENT: PAIN_FUNCTIONAL_ASSESSMENT: NONE - DENIES PAIN

## 2022-12-07 LAB — SARS-COV-2, NAAT: NOT DETECTED

## 2022-12-07 PROCEDURE — 87635 SARS-COV-2 COVID-19 AMP PRB: CPT

## 2022-12-07 NOTE — DISCHARGE INSTRUCTIONS
Please follow-up with your primary care provider. Please contact your guardian/power of  and inform them of your emergency room visit.

## 2022-12-07 NOTE — ED TRIAGE NOTES
Patient arrived from nursing home via ems with complaints of diarrhea and two episodes of vomiting/spitting up that started today. Per the staff the stools are dark.

## 2022-12-31 NOTE — ED PROVIDER NOTES
[facility]  Department of Emergency Medicine     Written by: Fredi Hsieh DO  Patient Name: Izabela Cleveland  Attending Provider: No att. providers found  Admit Date: 2022 10:27 PM  MRN: [id number]                   : 1935        Chief Complaint   Patient presents with    Diarrhea    Emesis    - Chief complaint    HPI     88 y/o female coming from nursing home after having a couple episodes of spitting up/vomit. Staff also noted stools are dark. Pt currently has no complaints and does not want to be here. She brings documentation stating she is DNR-CC. None of her emergency contacts answered the phone and her POA did not answer. PT has no complaints at this time and appears to be a good historian.     Review of Systems   Constitutional:  Negative for chills and fever.   HENT:  Negative for ear pain and sinus pain.    Eyes:  Negative for pain.   Respiratory:  Negative for cough and shortness of breath.    Cardiovascular:  Negative for chest pain.   Gastrointestinal:  Negative for abdominal pain, constipation, diarrhea, nausea and vomiting.   Genitourinary:  Negative for difficulty urinating, dysuria and flank pain.   Musculoskeletal:  Negative for back pain.   Skin:  Negative for rash.   Neurological:  Negative for dizziness, weakness, light-headedness and headaches.   Psychiatric/Behavioral:  Negative for confusion.       Physical Exam  Constitutional:       General: She is not in acute distress.     Appearance: Normal appearance. She is not ill-appearing.   HENT:      Head: Normocephalic.      Right Ear: External ear normal.      Left Ear: External ear normal.      Nose: Nose normal. No congestion or rhinorrhea.      Mouth/Throat:      Mouth: Mucous membranes are moist.   Eyes:      Conjunctiva/sclera: Conjunctivae normal.      Pupils: Pupils are equal, round, and reactive to light.   Cardiovascular:      Rate and Rhythm: Normal rate and regular rhythm.      Pulses: Normal pulses.  Pulmonary:      Effort: Pulmonary effort is normal. No respiratory distress. Breath sounds: Normal breath sounds. No stridor. No wheezing or rales. Abdominal:      General: Abdomen is flat. Bowel sounds are normal. There is no distension. Palpations: Abdomen is soft. Tenderness: There is no abdominal tenderness. There is no guarding. Musculoskeletal:         General: No tenderness. Normal range of motion. Cervical back: Normal range of motion and neck supple. Skin:     General: Skin is warm. Findings: No erythema, lesion or rash. Neurological:      General: No focal deficit present. Mental Status: She is oriented to person, place, and time. Sensory: No sensory deficit. Motor: No weakness. Psychiatric:         Behavior: Behavior normal.        Procedures       MDM  Number of Diagnoses or Management Options  DNR (do not resuscitate) discussion  Nausea vomiting and diarrhea  Diagnosis management comments: 82 y/o female arrived from NH for a couple episodes of spitting up and reported dark stool via nursing aids at [facility]. Pt denies the dark stool. She denies any symptomatology at this time and stated that she spits up or vomits every once and a while. She does not want to be in the ED and feels fine. She appears to be good historian. Denies fevers, chills, N/V, diarrhea, CP, SOB. She admits to nothing. Arrived with [facility] signed documentation and POA was unable to be contacted. We did not feel it necessary to pursue treatment at this time based on her [facility] and inability to obtain consent for treatment. PT did not want treatment. Documentation indicated a need for negative covid to return to NH and that was the only test done at this time.  Pt has stable vitals on arrival and throughout her entire stay until she was discharged back to NH.                --------------------------------------------- PAST HISTORY ---------------------------------------------  Past Medical History:  has a past medical history of Closed right hip fracture ([facility] [address] Dementia ([facility] [address] DVT (deep venous thrombosis) ([facility] [address] GERD (gastroesophageal reflux disease), Hyperlipidemia, Hypertension, Left scapula fracture, Metatarsal fracture, Moderate protein malnutrition ([facility] [address] Multiple fractures, Radial styloid fracture, and Type II or unspecified type diabetes mellitus without mention of complication, not stated as uncontrolled. 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 Social History:  reports that she has never smoked. Her smokeless tobacco use includes chew. She reports that she does not drink alcohol and does not use drugs. Family History: family history is not on file. The patients home medications have been reviewed. Allergies: Patient has no known allergies. -------------------------------------------------- RESULTS -------------------------------------------------  Labs:  Results for orders placed or performed during the hospital encounter of 12/06/22   COVID-19, Rapid    Specimen: Nasopharyngeal Swab   Result Value Ref Range    SARS-CoV-2, NAAT Not Detected Not Detected       Radiology:  No orders to display       ------------------------- NURSING NOTES AND VITALS REVIEWED ---------------------------  Date / Time Roomed:  12/6/2022 10:27 PM  ED Bed Assignment:  27/27    The nursing notes within the ED encounter and vital signs as below have been reviewed.    BP (!) 144/113   Pulse 96   Temp 97.7 °F (36.5 °C) (Oral)   Resp 16   Ht 5' 4\" (1.626 m)   Wt 142 lb (64.4 kg)   SpO2 97%   BMI 24.37 kg/m²   Oxygen Saturation Interpretation: Normal      ------------------------------------------ PROGRESS NOTES ------------------------------------------  6:45 AM EST  I have spoken with the patient and discussed todays results, in addition to providing specific details for the plan of care and counseling regarding the diagnosis and prognosis. Their questions are answered at this time and they are agreeable with the plan. I discussed at length with them reasons for immediate return here for re evaluation. They will followup with their primary care physician by calling their office tomorrow. --------------------------------- ADDITIONAL PROVIDER NOTES ---------------------------------  At this time the patient is without objective evidence of an acute process requiring hospitalization or inpatient management. They have remained hemodynamically stable throughout their entire ED visit and are stable for discharge with outpatient follow-up. The plan has been discussed in detail and they are aware of the specific conditions for emergent return, as well as the importance of follow-up. Discharge Medication List as of 12/7/2022 12:33 AM          Diagnosis:  1. Nausea vomiting and diarrhea    2. DNR (do not resuscitate) discussion        Disposition:  Patient's disposition: Discharge to home  Patient's condition is stable. Patient was seen and evaluated by myself and my attending Giovanni White DO. Assessment and Plan discussed with attending provider, please see attestation for final plan of care.      Flores Schmitt, [address] [place] [address]  Resident  12/31/22 [id number]

## (undated) DEVICE — REAGENT TEST UREASE RAPD CLOTEST F/

## (undated) DEVICE — SPONGE GZ W4XL4IN RAYON POLY FILL CVR W/ NONWOVEN FAB

## (undated) DEVICE — FORCEPS BX OVL CUP FEN DISPOSABLE CAP L 160CM RAD JAW 4

## (undated) DEVICE — BLOCK BITE 60FR RUBBER ADLT DENTAL

## (undated) DEVICE — GRADUATE TRIANG MEASURE 1000ML BLK PRNT